# Patient Record
Sex: MALE | Race: WHITE | NOT HISPANIC OR LATINO | Employment: OTHER | URBAN - METROPOLITAN AREA
[De-identification: names, ages, dates, MRNs, and addresses within clinical notes are randomized per-mention and may not be internally consistent; named-entity substitution may affect disease eponyms.]

---

## 2019-10-23 ENCOUNTER — CONSULT (OUTPATIENT)
Dept: CARDIOLOGY | Facility: CLINIC | Age: 69
End: 2019-10-23

## 2019-10-23 VITALS
BODY MASS INDEX: 30.1 KG/M2 | WEIGHT: 215 LBS | SYSTOLIC BLOOD PRESSURE: 146 MMHG | HEART RATE: 57 BPM | HEIGHT: 71 IN | DIASTOLIC BLOOD PRESSURE: 82 MMHG | OXYGEN SATURATION: 98 %

## 2019-10-23 DIAGNOSIS — R93.1 ABNORMAL ECHOCARDIOGRAM: Primary | ICD-10-CM

## 2019-10-23 DIAGNOSIS — G45.9 TIA (TRANSIENT ISCHEMIC ATTACK): ICD-10-CM

## 2019-10-23 DIAGNOSIS — I66.13: ICD-10-CM

## 2019-10-23 DIAGNOSIS — E78.5 DYSLIPIDEMIA: ICD-10-CM

## 2019-10-23 DIAGNOSIS — I48.91 ATRIAL FIBRILLATION, UNSPECIFIED TYPE (HCC): ICD-10-CM

## 2019-10-23 DIAGNOSIS — I10 ESSENTIAL HYPERTENSION: ICD-10-CM

## 2019-10-23 PROBLEM — E07.9 THYROID DISEASE: Status: ACTIVE | Noted: 2019-10-23

## 2019-10-23 PROCEDURE — 93000 ELECTROCARDIOGRAM COMPLETE: CPT | Performed by: PHYSICIAN ASSISTANT

## 2019-10-23 PROCEDURE — 99204 OFFICE O/P NEW MOD 45 MIN: CPT | Performed by: PHYSICIAN ASSISTANT

## 2019-10-23 RX ORDER — UBIDECARENONE 100 MG
100 CAPSULE ORAL DAILY
COMMUNITY

## 2019-10-23 RX ORDER — FERROUS SULFATE 325(65) MG
325 TABLET ORAL
COMMUNITY

## 2019-10-23 RX ORDER — HYDROCHLOROTHIAZIDE 12.5 MG/1
12.5 CAPSULE, GELATIN COATED ORAL DAILY
Qty: 30 CAPSULE | Refills: 11 | Status: SHIPPED | OUTPATIENT
Start: 2019-10-23 | End: 2020-05-08

## 2019-10-23 RX ORDER — LEVOTHYROXINE SODIUM 137 UG/1
137 TABLET ORAL DAILY
COMMUNITY

## 2019-10-23 RX ORDER — OMEPRAZOLE 20 MG/1
20 CAPSULE, DELAYED RELEASE ORAL DAILY
COMMUNITY

## 2019-10-23 RX ORDER — CARVEDILOL 25 MG/1
25 TABLET ORAL 2 TIMES DAILY WITH MEALS
COMMUNITY

## 2019-10-23 RX ORDER — CLOPIDOGREL BISULFATE 75 MG/1
75 TABLET ORAL DAILY
COMMUNITY

## 2019-10-23 RX ORDER — RAMIPRIL 10 MG/1
10 CAPSULE ORAL 2 TIMES DAILY
COMMUNITY

## 2019-10-23 RX ORDER — AMLODIPINE BESYLATE 5 MG/1
5 TABLET ORAL DAILY
COMMUNITY

## 2019-10-23 RX ORDER — ASPIRIN 81 MG/1
81 TABLET ORAL DAILY
COMMUNITY

## 2019-10-23 RX ORDER — PAROXETINE HYDROCHLORIDE HEMIHYDRATE 25 MG/1
25 TABLET, FILM COATED, EXTENDED RELEASE ORAL EVERY MORNING
COMMUNITY

## 2019-10-23 RX ORDER — ROSUVASTATIN CALCIUM 20 MG/1
20 TABLET, COATED ORAL DAILY
COMMUNITY

## 2019-10-23 RX ORDER — CYANOCOBALAMIN 1000 UG/ML
1000 INJECTION, SOLUTION INTRAMUSCULAR; SUBCUTANEOUS
COMMUNITY

## 2019-10-23 NOTE — PROGRESS NOTES
[facility] - Office Note  Nikolas Le         [address] [facility] [address]  1950   [phone] (home)      LOCATION:  [facility] office.  Visit Type: Consult.    PCP:  Tae Bansal, DO    10/23/19   Nikolas Le is a 69 y.o.  male  retired.      Chief Complaint: Abnormal Echo, PFO.    Problem List/PMHx:   1.  Abnormal Echocardiogram       A.  Transthoracic echo 9/14/2019 Dr. Pennington: EF 55 to 60%, mild concentric LVH, mild AR, PA pressure 29 mmHg.       B.  Omitted echo with bubble study only 9/24/2019: Positive for patent foramen ovale with intra-arterial shunt from right to left.   2.  TIA (Transient Ischemic Attack)       A.  Presents with double vision, dysphasia, slurred speech       B.  No acute findings by imaging.  Echo shows normal EF.  Positive PFO by limited study.  Carotid duplex shows less than 50% stenosis bilateral ICA.       C.  Placed on Plavix in addition to low-dose aspirin, statin therapy.   3.  Essential Hypertension   4.  Dyslipidemia   5.  Thyroid Disease, on chronic supplementation  6.  Osteoarthritis  7.  Pernicious anemia       Allergies   Allergen Reactions   • Keflex [Cephalexin] Other (See Comments)     Chills. Body aches   • Penicillins Unknown (See Comments)     As a child   • Tetracyclines & Related Other (See Comments)     Mouth Blisters         Current Outpatient Medications:   •  amLODIPine (NORVASC) 5 MG tablet, Take 5 mg by mouth Daily., Disp: , Rfl:   •  aspirin 81 MG EC tablet, Take 81 mg by mouth Daily., Disp: , Rfl:   •  carvedilol (COREG) 25 MG tablet, Take 25 mg by mouth 2 (Two) Times a Day With Meals., Disp: , Rfl:   •  clopidogrel (PLAVIX) 75 MG tablet, Take 75 mg by mouth Daily., Disp: , Rfl:   •  coenzyme Q10 100 MG capsule, Take 100 mg by mouth Daily., Disp: , Rfl:   •  cyanocobalamin 1000 MCG/ML injection, Inject 1,000 mcg into the appropriate muscle as directed by prescriber Every 14 (Fourteen) Days., Disp:  , Rfl:   •  ferrous sulfate 325 (65 FE) MG tablet, Take 325 mg by mouth Daily With Breakfast., Disp: , Rfl:   •  levothyroxine (SYNTHROID, LEVOTHROID) 137 MCG tablet, Take 137 mcg by mouth Daily., Disp: , Rfl:   •  omeprazole (priLOSEC) 20 MG capsule, Take 20 mg by mouth Daily., Disp: , Rfl:   •  PARoxetine CR (PAXIL-CR) 25 MG 24 hr tablet, Take 25 mg by mouth Every Morning., Disp: , Rfl:   •  ramipril (ALTACE) 10 MG capsule, Take 10 mg by mouth 2 (Two) Times a Day., Disp: , Rfl:   •  rosuvastatin (CRESTOR) 20 MG tablet, Take 20 mg by mouth Daily., Disp: , Rfl:   •  Triamcinolone Acetonide (KENALOG-40 IJ), Inject  as directed., Disp: , Rfl:   •  VITAMIN D PO, Take 1,200 Units by mouth., Disp: , Rfl:     HPI  Nikolas Le is a 69-year-old  male who comes us today in consultation for an abnormal echocardiogram.  He recently had a TIA and underwent a neurologic work-up.  He presented with approximate 1 to 2 minutes of double vision followed by a aphasia and slurred speech.  By the time he arrived to the hospital he was back to baseline.  Carotid duplex showed less than 50% bilateral stenoses, echocardiogram showed no significant valvular heart disease, normal EF.  A limited echo/bubble study only confirmed PFO.  At time of discharge, he was continued on low-dose aspirin with the addition of Plavix.  His statin was changed from Lipitor to Crestor and an additional antihypertensive was also added.    He has been referred to us for further care team evaluation of the PFO.  He has had no prior neurologic issues or symptoms.  He has been on blood pressure medications for several years with fairly good control.  He denies chest pain or angina, denies dyspnea or orthopnea.  He denies palpitations or irregular heartbeats.  Again, this episode of double vision was his first and he has had no further recurrence.          The following portions of the patient's history were reviewed in the chart and updated as  "appropriate: allergies, current medications, past family history, past medical history, past social history, past surgical history and problem list.    Review of Systems   Constitution: Negative for weakness and malaise/fatigue.   Eyes: Positive for double vision and pain. Negative for blurred vision, vision loss in left eye, vision loss in right eye and visual halos.   Cardiovascular: Negative for chest pain, dyspnea on exertion, irregular heartbeat, leg swelling, near-syncope, orthopnea and palpitations.   Respiratory: Negative for shortness of breath, sleep disturbances due to breathing and snoring.    Musculoskeletal: Positive for arthritis, joint pain and stiffness. Negative for falls.   Neurological: Negative for excessive daytime sleepiness, dizziness, headaches and light-headedness.   All other systems reviewed and are negative.            height is 180.3 cm (71\") and weight is 97.5 kg (215 lb). His blood pressure is 146/82 and his pulse is 57. His oxygen saturation is 98%.   Physical Exam   Constitutional: Vital signs are normal. He appears well-developed and well-nourished.   HENT:   Head: Normocephalic and atraumatic.   Right Ear: Hearing and external ear normal.   Left Ear: Hearing and external ear normal.   Nose: Nose normal. No septal deviation.   Mouth/Throat: Oropharynx is clear and moist and mucous membranes are normal.   Eyes: Conjunctivae, EOM and lids are normal. Pupils are equal, round, and reactive to light. Right conjunctiva is not injected. Left conjunctiva is not injected.   Neck: Normal range of motion. No JVD present. Carotid bruit is not present. No edema and no erythema present. No thyroid mass and no thyromegaly present.   Cardiovascular: Normal rate, regular rhythm, S1 normal, S2 normal, normal heart sounds, intact distal pulses and normal pulses. PMI is not displaced.   No murmur heard.  Pulses:       Radial pulses are 2+ on the right side, and 2+ on the left side.        Dorsalis " pedis pulses are 2+ on the right side, and 2+ on the left side.        Posterior tibial pulses are 2+ on the right side, and 2+ on the left side.   Pulmonary/Chest: Effort normal and breath sounds normal. No respiratory distress. He has no decreased breath sounds. He has no wheezes. He has no rhonchi. He has no rales.   Abdominal: Soft. Normal appearance, normal aorta and bowel sounds are normal. He exhibits no abdominal bruit and no mass. There is no hepatosplenomegaly. There is no tenderness.   Musculoskeletal: Normal range of motion.   Neurological: He is alert.   Skin: Skin is warm, dry and intact. No cyanosis. Nails show no clubbing.   Psychiatric: He has a normal mood and affect. His speech is normal.       I have examined the patient and agree with the above      ECG 12 Lead  Date/Time: 10/23/2019 11:10 AM  Performed by: Brandi Hung PA  Authorized by: Brandi Hung PA   Previous ECG: no previous ECG available  Rhythm: sinus rhythm  Rate: normal  QRS axis: normal    Clinical impression: non-specific ECG             Assessment/ Plan     Abnormal echocardiogram: Echo reports reviewed.  Normal EF, no valvular or wall motion abnormalities.  PFO noted on a limited bubble study.  Mr. Le does have multiple risk factors which include age, hypertension, dyslipidemia.  At this time we will proceed with transcranial Doppler as this is more specific than DOTTIE for evaluation of TIA/PFO findings.  Further recommendations based on outcomes.  Would continue on aspirin, Plavix, statin in the meantime.    Essential hypertension: He is currently on ACE inhibitor, beta-blocker with a new addition of calcium channel blocker.  He could still use better control.  Add HCTZ 12.5 mg    Dyslipidemia: Lipid panel 2/12/2019 reviewed: , TG 84, HDL 53, LDL 96.  Continue on dose statin therapy and appropriate diet.  Weight loss and aerobic activity also encouraged.  Check lipids to ensure LDL near 70.    TIA (transient  "ischemic attack): Recent episode of double vision with speech involvement.  Other than PFO noted on limited bubble study, work-up was within normal limits.  I believe the likely causes his carotid artery disease, and less likely asymptomatic atrial fibrillation, with paradoxical embolism through his PFO being the cause.  Continue dual antiplatelet therapy and statin for now.  Continue blood pressure management adding HCTZ as described above.  Check TCD, and A. fib monitor.  Further recommendations based on further evaluation of the PFO, which we will close only if there is significantly elevated \"hit\" rate on his transcranial Doppler.        Brandi Hung PA-C working with Dr. Morrow  10/23/2019 11:01 AM    Electronically signed by NORAH Stone, 10/23/19, 11:19 AM.  I have seen and examined the patient, I have reviewed the note, discussed the case with the advance practice clinician, made necessary changes and I agree with the final note.    Shaheed Morrow MD  10/23/19  12:19 PM        "

## 2019-11-05 ENCOUNTER — HOSPITAL ENCOUNTER (OUTPATIENT)
Dept: CARDIOLOGY | Facility: HOSPITAL | Age: 69
Discharge: HOME | End: 2019-11-05
Admitting: INTERNAL MEDICINE

## 2019-11-05 ENCOUNTER — HOSPITAL ENCOUNTER (OUTPATIENT)
Dept: CARDIOLOGY | Facility: HOSPITAL | Age: 69
Discharge: HOME | End: 2019-11-05

## 2019-11-05 DIAGNOSIS — I66.13: ICD-10-CM

## 2019-11-05 DIAGNOSIS — G45.9 TIA (TRANSIENT ISCHEMIC ATTACK): ICD-10-CM

## 2019-11-05 DIAGNOSIS — G45.9 TIA (TRANSIENT ISCHEMIC ATTACK): Primary | ICD-10-CM

## 2019-11-05 DIAGNOSIS — R93.1 ABNORMAL ECHOCARDIOGRAM: ICD-10-CM

## 2019-11-05 LAB
BH CV VAS TCD LEFT DISTAL M1: 48 CM/SEC
BH CV VAS TCD LEFT MID M1: 48 CM/SEC
BH CV VAS TCD LEFT PROXIMAL M1: 43 CM/SEC
BH CV VAS TCD LEFT TERMINAL ICA: 29 CM/SEC
BH CV VAS TCD RIGHT DISTAL M1: 55 CM/SEC
BH CV VAS TCD RIGHT MID M1: 52 CM/SEC
BH CV VAS TCD RIGHT PROXIMAL M1: 51 CM/SEC
BH CV VAS TCD RIGHT TERMINAL ICA: 50 CM/SEC

## 2019-11-05 PROCEDURE — 93893 TCD STD ICR ART VEN-ART SHNT: CPT

## 2019-11-07 ENCOUNTER — TELEPHONE (OUTPATIENT)
Dept: CARDIOLOGY | Facility: CLINIC | Age: 69
End: 2019-11-07

## 2019-11-07 NOTE — TELEPHONE ENCOUNTER
Spoke with patient, advised of below.  Understanding verbalized.         ----- Message from Shaheed Morrow MD sent at 11/6/2019  3:26 PM EST -----  See please notify patient, very few bubbles noted.  Discussed with the reading physician, not enough to require PFO closure.  Would simply continue aspirin and Plavix for now     pt comfortable meds given dinner given and tolerated . pt pending labs and observation

## 2019-12-06 ENCOUNTER — TELEPHONE (OUTPATIENT)
Dept: CARDIOLOGY | Facility: CLINIC | Age: 69
End: 2019-12-06

## 2020-05-07 NOTE — PROGRESS NOTES
[facility]  Telehealth Follow-up note  Subjective:     Encounter Date:05/08/2020      Patient ID: Nikolas Le is a  69 y.o. male.    Chief Complaint: Abnormal ECG      PROBLEM LIST:  1. PFO:  a. Echo, 09/14/2019, Dr. Pennington: EF 55-60%. Mild concentric LVH. Mild AI.   b. Bubble study, 09/24/2019: POSITIVE for PFO with interatrial shunt from right-to-left.   c. Doppler transcranial microbubble injection, 11/05/2019: small amount HITS are seen in the left MCA.  2. TIA:   a. ED presentation with double vision, dysphasia, slurred speech.[facility] 09/13/201.Medical therapy.  b. No acute findings by imaging. Echo positive for PFO.   c. Carotid duplex, 09/13/2019: 0-49% ICA stenosis bilaterally.  d. MCOT, 10/29/2019: No AF. Rare PACs/PVCs.   e. 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  3. Palpitations:  a. 24h Holter, 04/25/2012: SR with HR  bpm. No significant arrhythmias. Rare PACs/PVCs.  b. Stress echocardiogram, 05/09/2012: EF 62%. No evidence of ischemia. Dobutamine induced LITZY with moderate gradient across the LVOT with peak gradient 43.8, mean gradient 23.5.  c. 24h Holter, 02/19/2016: SR with HR 50-82 bpm. No significant arrhythmias. Rare PACs/PVCs.  4. Hypertension.  5. Dyslipidemia.  6. Thyroid disease.  7. OA.  8. Surgical history:  a. Inguinal hernia repair.    History of Present Illness  Nikolas Le has a telephone visit today for 6 month follow up with a history of TIA, PFO, and cardiac risk factors. Since last visit, patient overall is doing very well.  Denies any cardiovascular plaints, specifically no angina shortness of breath chest pain or any symptoms with exertion.  No neurologic symptoms.  Review test results with the patient.    Allergies   Allergen Reactions   • Keflex [Cephalexin] Other (See Comments)     Chills. Body  aches   • Penicillins Unknown (See Comments)     As a child   • Tetracyclines & Related Other (See Comments)     Mouth Blisters         Current Outpatient Medications:   •  amLODIPine (NORVASC) 5 MG tablet, Take 5 mg by mouth Daily., Disp: , Rfl:   •  aspirin 81 MG EC tablet, Take 81 mg by mouth Daily., Disp: , Rfl:   •  carvedilol (COREG) 25 MG tablet, Take 25 mg by mouth 2 (Two) Times a Day With Meals., Disp: , Rfl:   •  clopidogrel (PLAVIX) 75 MG tablet, Take 75 mg by mouth Daily., Disp: , Rfl:   •  coenzyme Q10 100 MG capsule, Take 100 mg by mouth Daily., Disp: , Rfl:   •  cyanocobalamin 1000 MCG/ML injection, Inject 1,000 mcg into the appropriate muscle as directed by prescriber Every 14 (Fourteen) Days., Disp: , Rfl:   •  ferrous sulfate 325 (65 FE) MG tablet, Take 325 mg by mouth Daily With Breakfast., Disp: , Rfl:   •  hydroxychloroquine (PLAQUENIL) 200 MG tablet, Take 200 mg by mouth 2 (Two) Times a Day., Disp: , Rfl:   •  levothyroxine (SYNTHROID, LEVOTHROID) 137 MCG tablet, Take 137 mcg by mouth Daily., Disp: , Rfl:   •  Magnesium 400 MG tablet, Take 400 mg by mouth Daily., Disp: , Rfl:   •  omeprazole (priLOSEC) 20 MG capsule, Take 20 mg by mouth Daily., Disp: , Rfl:   •  PARoxetine CR (PAXIL-CR) 25 MG 24 hr tablet, Take 25 mg by mouth Every Morning., Disp: , Rfl:   •  ramipril (ALTACE) 10 MG capsule, Take 10 mg by mouth 2 (Two) Times a Day., Disp: , Rfl:   •  rosuvastatin (CRESTOR) 20 MG tablet, Take 20 mg by mouth Daily., Disp: , Rfl:   •  Triamcinolone Acetonide (KENALOG-40 IJ), Inject  as directed., Disp: , Rfl:   •  VITAMIN D PO, Take 1,200 Units by mouth., Disp: , Rfl:   •  Zinc 50 MG tablet, Take 50 mg by mouth Daily., Disp: , Rfl:     The following portions of the patient's history were reviewed and updated as appropriate: allergies, current medications, past family history, past medical history, past social history, past surgical history and problem list.    Review of Systems   Constitution:  "Negative.   Cardiovascular: Negative.    Respiratory: Negative.    Hematologic/Lymphatic: Negative for bleeding problem. Does not bruise/bleed easily.   Skin: Negative for rash.   Musculoskeletal: Positive for arthritis. Negative for muscle weakness and myalgias.   Gastrointestinal: Negative for heartburn, nausea and vomiting.   Neurological: Negative.           Objective:     Vitals:    05/08/20 0852   BP: 146/80   BP Location: Right arm   Patient Position: Sitting   Pulse: 65   Weight: 95.7 kg (211 lb)   Height: 180.3 cm (71\")         Physical Exam  No physical exam performed secondary to telephone visit.     Lab Review:    Lab date: 02/11/2019  • FLP: , TG 84, HDL 53, LDL 96  • CMP within normal limits       Procedures        Assessment:   Nikolas was seen today for abnormal ecg.    Diagnoses and all orders for this visit:    TIA (transient ischemic attack)    Essential hypertension    Dyslipidemia          Plan:  1. TIA, due to small vessel disease most likely.  Continue aspirin Plavix and statin.  2. Dyslipidemia on high-dose statin will recheck lipids through primary care physician at next visit.  3. Small PFO, very little right to left shunting noted with Valsalva.  We will simply follow for now.  Does not need closed at this time.  4. Hypertension, normally well controlled, patient just took his medicines 10 minutes for the above check.  5. Continue current medications.  6. Revisit in 12 MO, or sooner as needed.    This patient has consented to a telehealth visit via telephone. The visit was scheduled as a telephone visit to comply with patient safety concerns in accordance with CDC recommendations.  All vitals recorded within this visit are reported by the patient.  I spent 13 minutes in total including but not limited to the 6 minutes spent in direct conversation with this patient.       Shaheed Morrow MD      Please note that portions of this note may have been completed with a voice recognition " program. Efforts were made to edit the dictations, but occasionally words are mistranscribed.

## 2020-05-08 ENCOUNTER — OFFICE VISIT (OUTPATIENT)
Dept: CARDIOLOGY | Facility: CLINIC | Age: 70
End: 2020-05-08

## 2020-05-08 VITALS
HEART RATE: 65 BPM | SYSTOLIC BLOOD PRESSURE: 146 MMHG | BODY MASS INDEX: 29.54 KG/M2 | DIASTOLIC BLOOD PRESSURE: 80 MMHG | WEIGHT: 211 LBS | HEIGHT: 71 IN

## 2020-05-08 DIAGNOSIS — E78.5 DYSLIPIDEMIA: ICD-10-CM

## 2020-05-08 DIAGNOSIS — G45.9 TIA (TRANSIENT ISCHEMIC ATTACK): Primary | ICD-10-CM

## 2020-05-08 DIAGNOSIS — I10 ESSENTIAL HYPERTENSION: ICD-10-CM

## 2020-05-08 PROCEDURE — 99441 PR PHYS/QHP TELEPHONE EVALUATION 5-10 MIN: CPT | Performed by: INTERNAL MEDICINE

## 2020-05-08 RX ORDER — ZINC GLUCONATE 50 MG
50 TABLET ORAL DAILY
COMMUNITY

## 2020-05-08 RX ORDER — CALCIUM CARBONATE 300MG(750)
400 TABLET,CHEWABLE ORAL DAILY
COMMUNITY

## 2020-05-08 RX ORDER — HYDROXYCHLOROQUINE SULFATE 200 MG/1
200 TABLET, FILM COATED ORAL 2 TIMES DAILY
COMMUNITY

## 2021-05-08 NOTE — PROGRESS NOTES
[facility]  Subjective:     Encounter Date: 05/10/2021      Patient ID: Nikolas Le is a 70 y.o. male.    Chief Complaint: Transient Ischemic Attack      PROBLEM LIST:  1. PFO:  a. Echo, 09/14/2019, Dr. Pennington: EF 55-60%. Mild concentric LVH. Mild AI.   b. Bubble study, 09/24/2019: POSITIVE for PFO with interatrial shunt from right-to-left.   c. Doppler transcranial microbubble injection, 11/05/2019: small amount HITS are seen in the left MCA.  2. TIA:   a. ED presentation with double vision, dysphasia, slurred speech.[facility] 09/13/201.Medical therapy.  b. No acute findings by imaging. Echo positive for PFO.   c. Carotid duplex, 09/13/2019: 0-49% ICA stenosis bilaterally.  d. MCOT, 10/29/2019: No AF. Rare PACs/PVCs.   e. 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  3. Palpitations:  a. 24h Holter, 04/25/2012: SR with HR  bpm. No significant arrhythmias. Rare PACs/PVCs.  b. Stress echocardiogram, 05/09/2012: EF 62%. No evidence of ischemia. Dobutamine induced LITZY with moderate gradient across the LVOT with peak gradient 43.8, mean gradient 23.5.  c. 24h Holter, 02/19/2016: SR with HR 50-82 bpm. No significant arrhythmias. Rare PACs/PVCs.  4. Hypertension.  5. Dyslipidemia.  6. Thyroid disease.  7. OA.  8. Surgical history:  a. Inguinal hernia repair.      History of Present Illness  Nikolas Le returns today for an annual follow up with a history of transient ischemic attack and cardiac risk factors. Since last visit, he has been doing well overall from a cardiovascular standpoint. He mentions he bleeds and bruises easily since being on Plavix. He also has been using kenalog injections for his arthritis. He has not done much physically since the pandemic. He has received both of his COVID-19 immunizations. He recently just go back from [facility]  vipin and mentions he did no thave to wear a mask there. atient denies chest pain, shortness of breath, palpitations, edema, dizziness, and syncope. Patient denies chest pain, shortness of breath, palpitations, edema, dizziness, and syncope.         Allergies   Allergen Reactions   • Tetracyclines & Related Other (See Comments)     Mouth Blisters   • Erythromycin Rash and Unknown - Low Severity   • Keflex [Cephalexin] Other (See Comments)     Chills. Body aches   • Penicillins Unknown (See Comments)     As a child   • Sulfa Antibiotics Rash     Swelling and sores in mouth  Mouth sores           Current Outpatient Medications:   •  amLODIPine (NORVASC) 5 MG tablet, Take 5 mg by mouth Daily., Disp: , Rfl:   •  aspirin 81 MG EC tablet, Take 81 mg by mouth Daily., Disp: , Rfl:   •  carvedilol (COREG) 25 MG tablet, Take 25 mg by mouth 2 (Two) Times a Day With Meals., Disp: , Rfl:   •  clopidogrel (PLAVIX) 75 MG tablet, Take 75 mg by mouth Daily., Disp: , Rfl:   •  coenzyme Q10 100 MG capsule, Take 100 mg by mouth Daily., Disp: , Rfl:   •  cyanocobalamin 1000 MCG/ML injection, Inject 1,000 mcg into the appropriate muscle as directed by prescriber Every 14 (Fourteen) Days., Disp: , Rfl:   •  ferrous sulfate 325 (65 FE) MG tablet, Take 325 mg by mouth Daily With Breakfast., Disp: , Rfl:   •  HYDROcodone-acetaminophen (NORCO) 5-325 MG per tablet, Take 1 tablet by mouth Every 6 (Six) Hours As Needed., Disp: , Rfl:   •  hydroxychloroquine (PLAQUENIL) 200 MG tablet, Take 200 mg by mouth 2 (Two) Times a Day., Disp: , Rfl:   •  levothyroxine (SYNTHROID, LEVOTHROID) 137 MCG tablet, Take 137 mcg by mouth Daily., Disp: , Rfl:   •  Magnesium 400 MG tablet, Take 400 mg by mouth Daily., Disp: , Rfl:   •  omeprazole (priLOSEC) 20 MG capsule, Take 20 mg by mouth Daily., Disp: , Rfl:   •  PARoxetine CR (PAXIL-CR) 25 MG 24 hr tablet, Take 25 mg by mouth Every Morning., Disp: , Rfl:   •  ramipril (ALTACE) 10 MG capsule, Take 10 mg by mouth  "2 (Two) Times a Day., Disp: , Rfl:   •  rosuvastatin (CRESTOR) 20 MG tablet, Take 20 mg by mouth Daily., Disp: , Rfl:   •  Triamcinolone Acetonide (KENALOG-40 IJ), Inject  as directed. Every 4 months, Disp: , Rfl:   •  VITAMIN D PO, Take 1,200 Units by mouth., Disp: , Rfl:   •  Zinc 50 MG tablet, Take 50 mg by mouth Daily., Disp: , Rfl:     The following portions of the patient's history were reviewed and updated as appropriate: allergies, current medications, past family history, past medical history, past social history, past surgical history and problem list.    Review of Systems   Constitutional: Negative.   Cardiovascular: Negative for chest pain, dyspnea on exertion, leg swelling, palpitations and syncope.   Respiratory: Negative.  Negative for shortness of breath.    Hematologic/Lymphatic: Negative for bleeding problem. Bruises/bleeds easily.   Skin: Negative for rash.   Musculoskeletal: Positive for arthritis, joint pain and joint swelling. Negative for muscle weakness and myalgias.   Gastrointestinal: Negative for heartburn, nausea and vomiting.   Neurological: Negative for dizziness, light-headedness, loss of balance and numbness.          Objective:     Vitals:    05/10/21 0924   BP: 132/76   BP Location: Left arm   Patient Position: Sitting   Pulse: 73   Temp: 96 °F (35.6 °C)   SpO2: 97%   Weight: 97.8 kg (215 lb 9.6 oz)   Height: 180.3 cm (71\")         Constitutional:       Appearance: Well-developed.   Neck:      Thyroid: No thyromegaly.      Vascular: No carotid bruit or JVD.   Pulmonary:      Breath sounds: Normal breath sounds.   Cardiovascular:      Regular rhythm.      No gallop. No S3 and S4 gallop.   Edema:     Peripheral edema absent.   Abdominal:      General: Bowel sounds are normal.      Palpations: Abdomen is soft. There is no abdominal mass.      Tenderness: There is no abdominal tenderness.   Skin:     General: Skin is warm and dry.      Findings: No rash.   Neurological:      Mental " Status: Alert and oriented to person, place, and time.         Lab Review:    Lab Results   Component Value Date    TSH 2.603 02/17/2020          Procedures     Advance Care Planning   ACP discussion was held with the patient during this visit. Patient does not have an advance directive, information provided.          Assessment:   Diagnoses and all orders for this visit:    1. TIA (transient ischemic attack) (Primary)    2. Essential hypertension    3. Dyslipidemia        Impression:  1. TIA. Stable. On Plavix.   2. Hypertension. Well controlled. On amlodipine, carvedilol, and ramipril.  3. Dyslipidemia. No new data to review. On rosuvastatin.     Plan:  1. Stable cardiac status.  2. Continue current medications.  3. Revisit in 12 MO, or sooner as needed.    Scribed for Shaheed Morrow MD by Poornima Schwarz. 5/10/2021 10:02 EDT      Shaheed Morrow MD      Please note that portions of this note may have been completed with a voice recognition program. Efforts were made to edit the dictations, but occasionally words are mistranscribed.

## 2021-05-10 ENCOUNTER — OFFICE VISIT (OUTPATIENT)
Dept: CARDIOLOGY | Facility: CLINIC | Age: 71
End: 2021-05-10

## 2021-05-10 VITALS
SYSTOLIC BLOOD PRESSURE: 132 MMHG | OXYGEN SATURATION: 97 % | HEIGHT: 71 IN | WEIGHT: 215.6 LBS | HEART RATE: 73 BPM | TEMPERATURE: 96 F | BODY MASS INDEX: 30.18 KG/M2 | DIASTOLIC BLOOD PRESSURE: 76 MMHG

## 2021-05-10 DIAGNOSIS — E78.5 DYSLIPIDEMIA: ICD-10-CM

## 2021-05-10 DIAGNOSIS — I10 ESSENTIAL HYPERTENSION: ICD-10-CM

## 2021-05-10 DIAGNOSIS — G45.9 TIA (TRANSIENT ISCHEMIC ATTACK): Primary | ICD-10-CM

## 2021-05-10 PROCEDURE — 99213 OFFICE O/P EST LOW 20 MIN: CPT | Performed by: INTERNAL MEDICINE

## 2021-05-10 RX ORDER — HYDROCODONE BITARTRATE AND ACETAMINOPHEN 5; 325 MG/1; MG/1
1 TABLET ORAL EVERY 6 HOURS PRN
COMMUNITY

## 2021-08-06 ENCOUNTER — TELEPHONE (OUTPATIENT)
Dept: CARDIOLOGY | Facility: CLINIC | Age: 71
End: 2021-08-06

## 2021-08-06 NOTE — TELEPHONE ENCOUNTER
Patient requesting pre procedure risk assessment for cataract removal and all teeth dental extraction as well as Plavix instructions.

## 2022-05-11 ENCOUNTER — OFFICE VISIT (OUTPATIENT)
Dept: CARDIOLOGY | Facility: CLINIC | Age: 72
End: 2022-05-11

## 2022-05-11 VITALS
SYSTOLIC BLOOD PRESSURE: 132 MMHG | BODY MASS INDEX: 29.68 KG/M2 | OXYGEN SATURATION: 96 % | HEIGHT: 71 IN | HEART RATE: 66 BPM | WEIGHT: 212 LBS | DIASTOLIC BLOOD PRESSURE: 68 MMHG

## 2022-05-11 DIAGNOSIS — I10 ESSENTIAL HYPERTENSION: Primary | ICD-10-CM

## 2022-05-11 DIAGNOSIS — Q21.12 PFO (PATENT FORAMEN OVALE): ICD-10-CM

## 2022-05-11 DIAGNOSIS — I10 ESSENTIAL HYPERTENSION: ICD-10-CM

## 2022-05-11 DIAGNOSIS — E78.5 DYSLIPIDEMIA: ICD-10-CM

## 2022-05-11 DIAGNOSIS — R93.1 ABNORMAL ECHOCARDIOGRAM: Primary | ICD-10-CM

## 2022-05-11 DIAGNOSIS — I65.23 BILATERAL CAROTID ARTERY STENOSIS: ICD-10-CM

## 2022-05-11 DIAGNOSIS — G45.9 TIA (TRANSIENT ISCHEMIC ATTACK): ICD-10-CM

## 2022-05-11 PROCEDURE — 99214 OFFICE O/P EST MOD 30 MIN: CPT | Performed by: INTERNAL MEDICINE

## 2022-05-11 NOTE — PROGRESS NOTES
Subjective:     Encounter Date:05/11/2022    Primary Care Physician: Tae Bansal DO      Patient ID: Nikolas Le is a 71 y.o. male.    Chief Complaint:Follow-up      PROBLEM LIST:  1. PFO:  a. Echo, 09/14/2019, Dr. Pennington: EF 55-60%. Mild concentric LVH. Mild AI.   b. Bubble study, 09/24/2019: POSITIVE for PFO with interatrial shunt from right-to-left.   c. Doppler transcranial microbubble injection, 11/05/2019: small amount HITS are seen in the left MCA.  2. TIA:   a. ED presentation with double vision, dysphasia, slurred speech.[facility] 09/13/201.Medical therapy.  b. No acute findings by imaging. Echo positive for PFO.   c. Carotid duplex, 09/13/2019: 0-49% ICA stenosis bilaterally.  d. MCOT, 10/29/2019: No AF. Rare PACs/PVCs.   e. Doppler transcranial microbubble injection, 11/05/2019: Following infusion of agitated saline, both before and after Valsalva, several HITS are seen in the left MCA.  3. Palpitations:  a. 24h Holter, 04/25/2012: SR with HR  bpm. No significant arrhythmias. Rare PACs/PVCs.  b. Stress echocardiogram, 05/09/2012: EF 62%. No evidence of ischemia. Dobutamine induced LITZY with moderate gradient across the LVOT with peak gradient 43.8, mean gradient 23.5.  c. 24h Holter, 02/19/2016: SR with HR 50-82 bpm. No significant arrhythmias. Rare PACs/PVCs.  4. Hypertension.  5. Dyslipidemia.  6. Thyroid disease.  7. OA.  8. Surgical history:  a. Inguinal hernia repair.        Allergies   Allergen Reactions   • Tetracyclines & Related Other (See Comments)     Mouth Blisters   • Erythromycin Rash and Unknown - Low Severity   • Keflex [Cephalexin] Other (See Comments)     Chills. Body aches   • Penicillins Unknown (See Comments)     As a child   • Sulfa Antibiotics Rash     Swelling and sores in mouth  Mouth sores           Current Outpatient Medications:   •  amLODIPine (NORVASC) 5 MG tablet, Take 5 mg by mouth Daily., Disp: , Rfl:   •  aspirin 81 MG EC tablet, Take 81 mg by mouth  "Daily., Disp: , Rfl:   •  carvedilol (COREG) 25 MG tablet, Take 25 mg by mouth 2 (Two) Times a Day With Meals., Disp: , Rfl:   •  clopidogrel (PLAVIX) 75 MG tablet, Take 75 mg by mouth Daily., Disp: , Rfl:   •  coenzyme Q10 100 MG capsule, Take 100 mg by mouth Daily., Disp: , Rfl:   •  cyanocobalamin 1000 MCG/ML injection, Inject 1,000 mcg into the appropriate muscle as directed by prescriber Every 14 (Fourteen) Days., Disp: , Rfl:   •  ferrous sulfate 325 (65 FE) MG tablet, Take 325 mg by mouth Daily With Breakfast., Disp: , Rfl:   •  HYDROcodone-acetaminophen (NORCO) 5-325 MG per tablet, Take 1 tablet by mouth Every 6 (Six) Hours As Needed., Disp: , Rfl:   •  hydroxychloroquine (PLAQUENIL) 200 MG tablet, Take 200 mg by mouth 2 (Two) Times a Day., Disp: , Rfl:   •  levothyroxine (SYNTHROID, LEVOTHROID) 137 MCG tablet, Take 137 mcg by mouth Daily., Disp: , Rfl:   •  Magnesium 400 MG tablet, Take 400 mg by mouth Daily., Disp: , Rfl:   •  omeprazole (priLOSEC) 20 MG capsule, Take 20 mg by mouth Daily., Disp: , Rfl:   •  PARoxetine CR (PAXIL-CR) 25 MG 24 hr tablet, Take 25 mg by mouth Every Morning., Disp: , Rfl:   •  ramipril (ALTACE) 10 MG capsule, Take 10 mg by mouth 2 (Two) Times a Day., Disp: , Rfl:   •  rosuvastatin (CRESTOR) 20 MG tablet, Take 20 mg by mouth Daily., Disp: , Rfl:   •  Triamcinolone Acetonide (KENALOG-40 IJ), Inject  as directed. Every 4 months, Disp: , Rfl:   •  VITAMIN D PO, Take 1,200 Units by mouth., Disp: , Rfl:   •  Zinc 50 MG tablet, Take 50 mg by mouth Daily., Disp: , Rfl:         History of Present Illness    Patient returns today for annual follow-up of previous stroke due to pulm patent foramen ovale and palpitations.  Since her last visit, he is doing well from a cardiovascular standpoint without any cardiovascular symptoms.  He is very active after his \"knee steroid injections\" and he can walk quite some distance until the analgesic effect wears off.  He is preop left knee " "replacement.    The following portions of the patient's history were reviewed and updated as appropriate: allergies, current medications, past family history, past medical history, past social history, past surgical history and problem list.      Social History     Tobacco Use   • Smoking status: Former Smoker   • Smokeless tobacco: Never Used   Vaping Use   • Vaping Use: Never used   Substance Use Topics   • Alcohol use: No   • Drug use: No         ROS       Objective:   /68   Pulse 66   Ht 180.3 cm (71\")   Wt 96.2 kg (212 lb)   SpO2 96%   BMI 29.57 kg/m²         Constitutional:       Appearance: Well-developed.   Neck:      Thyroid: No thyromegaly.      Vascular: No carotid bruit or JVD.   Pulmonary:      Breath sounds: Normal breath sounds.   Cardiovascular:      Regular rhythm.      No gallop. No S3 and S4 gallop.   Abdominal:      General: Bowel sounds are normal.      Palpations: Abdomen is soft. There is no abdominal mass.      Tenderness: There is no abdominal tenderness.   Skin:     General: Skin is warm and dry.      Findings: No rash.   Neurological:      Mental Status: Alert and oriented to person, place, and time.         Procedures          Assessment:   Assessment & Plan      Diagnoses and all orders for this visit:    1. Essential hypertension (Primary)    2. Dyslipidemia    3. PFO (patent foramen ovale)      1.  Patent foramen ovale.  Previous TIA.  No events on aspirin and Plavix  2.  Tachypalpitations resolved  3.  Mild carotid artery disease  4.  Preop left knee replacement    Recommendations:  1.  Check echocardiogram today   2.  Follow-up carotid duplex for carotid known carotid artery disease.  3.  Low cardiovascular risk of left knee replacement surgery may proceed as planned.  May hold Plavix 5 days prior to the procedure.  4.  Revisit annually apparent symptom change    Shaheed Morrow MD           Dictated utilizing Dragon dictation  "

## 2022-06-30 ENCOUNTER — HOSPITAL ENCOUNTER (OUTPATIENT)
Dept: CARDIOLOGY | Facility: HOSPITAL | Age: 72
Discharge: HOME | End: 2022-06-30

## 2022-06-30 VITALS — WEIGHT: 212 LBS | HEIGHT: 71 IN | BODY MASS INDEX: 29.68 KG/M2

## 2022-06-30 DIAGNOSIS — Q21.12 PFO (PATENT FORAMEN OVALE): ICD-10-CM

## 2022-06-30 DIAGNOSIS — I65.23 BILATERAL CAROTID ARTERY STENOSIS: ICD-10-CM

## 2022-06-30 DIAGNOSIS — R93.1 ABNORMAL ECHOCARDIOGRAM: ICD-10-CM

## 2022-06-30 DIAGNOSIS — I10 ESSENTIAL HYPERTENSION: ICD-10-CM

## 2022-06-30 DIAGNOSIS — G45.9 TIA (TRANSIENT ISCHEMIC ATTACK): ICD-10-CM

## 2022-06-30 LAB
BH CV ECHO MEAS - AI P1/2T: 707.2 MSEC
BH CV ECHO MEAS - AO MAX PG: 6.2 MMHG
BH CV ECHO MEAS - AO MEAN PG: 3.5 MMHG
BH CV ECHO MEAS - AO ROOT DIAM: 3.7 CM
BH CV ECHO MEAS - AO V2 MAX: 124.9 CM/SEC
BH CV ECHO MEAS - AO V2 VTI: 32.9 CM
BH CV ECHO MEAS - AVA(I,D): 2.04 CM2
BH CV ECHO MEAS - EDV(CUBED): 81.8 ML
BH CV ECHO MEAS - EDV(MOD-SP2): 174 ML
BH CV ECHO MEAS - EDV(MOD-SP4): 204 ML
BH CV ECHO MEAS - EF(MOD-BP): 64.7 %
BH CV ECHO MEAS - EF(MOD-SP2): 60.1 %
BH CV ECHO MEAS - EF(MOD-SP4): 68.4 %
BH CV ECHO MEAS - ESV(CUBED): 29.8 ML
BH CV ECHO MEAS - ESV(MOD-SP2): 69.4 ML
BH CV ECHO MEAS - ESV(MOD-SP4): 64.4 ML
BH CV ECHO MEAS - FS: 28.5 %
BH CV ECHO MEAS - IVS/LVPW: 1.05 CM
BH CV ECHO MEAS - IVSD: 1.61 CM
BH CV ECHO MEAS - LA DIMENSION: 4.3 CM
BH CV ECHO MEAS - LAT PEAK E' VEL: 10.7 CM/SEC
BH CV ECHO MEAS - LV MASS(C)D: 280.6 GRAMS
BH CV ECHO MEAS - LV MAX PG: 2.7 MMHG
BH CV ECHO MEAS - LV MEAN PG: 1.66 MMHG
BH CV ECHO MEAS - LV V1 MAX: 82.3 CM/SEC
BH CV ECHO MEAS - LV V1 VTI: 22.3 CM
BH CV ECHO MEAS - LVIDD: 4.3 CM
BH CV ECHO MEAS - LVIDS: 3.1 CM
BH CV ECHO MEAS - LVOT AREA: 3 CM2
BH CV ECHO MEAS - LVOT DIAM: 1.96 CM
BH CV ECHO MEAS - LVPWD: 1.53 CM
BH CV ECHO MEAS - MED PEAK E' VEL: 9.1 CM/SEC
BH CV ECHO MEAS - MV A MAX VEL: 108.8 CM/SEC
BH CV ECHO MEAS - MV DEC TIME: 0.26 MSEC
BH CV ECHO MEAS - MV E MAX VEL: 69 CM/SEC
BH CV ECHO MEAS - MV E/A: 0.63
BH CV ECHO MEAS - MV MAX PG: 4.7 MMHG
BH CV ECHO MEAS - MV MEAN PG: 1.83 MMHG
BH CV ECHO MEAS - MV V2 VTI: 38.2 CM
BH CV ECHO MEAS - MVA(VTI): 1.76 CM2
BH CV ECHO MEAS - PA V2 MAX: 115.4 CM/SEC
BH CV ECHO MEAS - PI END-D VEL: 141.2 CM/SEC
BH CV ECHO MEAS - SV(LVOT): 67.3 ML
BH CV ECHO MEAS - SV(MOD-SP2): 104.6 ML
BH CV ECHO MEAS - SV(MOD-SP4): 139.6 ML
BH CV ECHO MEAS - TAPSE (>1.6): 3.2 CM
BH CV ECHO MEASUREMENTS AVERAGE E/E' RATIO: 6.97
BH CV XLRA - RV BASE: 4.4 CM
BH CV XLRA - RV LENGTH: 7.5 CM
BH CV XLRA - RV MID: 3.6 CM
BH CV XLRA - TDI S': 13.9 CM/SEC
BH CV XLRA MEAS LEFT DIST CCA EDV: 28.6 CM/SEC
BH CV XLRA MEAS LEFT DIST CCA PSV: 95.7 CM/SEC
BH CV XLRA MEAS LEFT DIST ICA EDV: 26.9 CM/SEC
BH CV XLRA MEAS LEFT DIST ICA PSV: 85.6 CM/SEC
BH CV XLRA MEAS LEFT ICA/CCA RATIO: 0.96
BH CV XLRA MEAS LEFT MID CCA EDV: 19.9 CM/SEC
BH CV XLRA MEAS LEFT MID CCA PSV: 80.8 CM/SEC
BH CV XLRA MEAS LEFT MID ICA EDV: 28.5 CM/SEC
BH CV XLRA MEAS LEFT MID ICA PSV: 84.5 CM/SEC
BH CV XLRA MEAS LEFT PROX CCA EDV: 32.9 CM/SEC
BH CV XLRA MEAS LEFT PROX CCA PSV: 165.6 CM/SEC
BH CV XLRA MEAS LEFT PROX ECA EDV: 24.3 CM/SEC
BH CV XLRA MEAS LEFT PROX ECA PSV: 164 CM/SEC
BH CV XLRA MEAS LEFT PROX ICA EDV: 18.1 CM/SEC
BH CV XLRA MEAS LEFT PROX ICA PSV: 77.4 CM/SEC
BH CV XLRA MEAS LEFT PROX SCLA PSV: 168.5 CM/SEC
BH CV XLRA MEAS LEFT VERTEBRAL A EDV: 8.7 CM/SEC
BH CV XLRA MEAS LEFT VERTEBRAL A PSV: 32.6 CM/SEC
BH CV XLRA MEAS RIGHT DIST CCA EDV: 26.7 CM/SEC
BH CV XLRA MEAS RIGHT DIST CCA PSV: 88.2 CM/SEC
BH CV XLRA MEAS RIGHT DIST ICA EDV: 28 CM/SEC
BH CV XLRA MEAS RIGHT DIST ICA PSV: 95.7 CM/SEC
BH CV XLRA MEAS RIGHT ICA/CCA RATIO: 0.96
BH CV XLRA MEAS RIGHT MID CCA EDV: 26.1 CM/SEC
BH CV XLRA MEAS RIGHT MID CCA PSV: 83.2 CM/SEC
BH CV XLRA MEAS RIGHT MID ICA EDV: 26.1 CM/SEC
BH CV XLRA MEAS RIGHT MID ICA PSV: 72.1 CM/SEC
BH CV XLRA MEAS RIGHT PROX CCA EDV: 21.7 CM/SEC
BH CV XLRA MEAS RIGHT PROX CCA PSV: 108.1 CM/SEC
BH CV XLRA MEAS RIGHT PROX ECA EDV: 21.7 CM/SEC
BH CV XLRA MEAS RIGHT PROX ECA PSV: 95.7 CM/SEC
BH CV XLRA MEAS RIGHT PROX ICA EDV: 16.8 CM/SEC
BH CV XLRA MEAS RIGHT PROX ICA PSV: 79.5 CM/SEC
BH CV XLRA MEAS RIGHT PROX SCLA PSV: 156 CM/SEC
BH CV XLRA MEAS RIGHT VERTEBRAL A EDV: 12.2 CM/SEC
BH CV XLRA MEAS RIGHT VERTEBRAL A PSV: 41.1 CM/SEC
LEFT ARM BP: NORMAL MMHG
LEFT ATRIUM VOLUME INDEX: 15.1 ML/M2
LV EF 2D ECHO EST: 60 %
MAXIMAL PREDICTED HEART RATE: 147 BPM
MAXIMAL PREDICTED HEART RATE: 147 BPM
RIGHT ARM BP: NORMAL MMHG
STRESS TARGET HR: 125 BPM
STRESS TARGET HR: 125 BPM

## 2022-06-30 PROCEDURE — 93306 TTE W/DOPPLER COMPLETE: CPT

## 2022-06-30 PROCEDURE — 93306 TTE W/DOPPLER COMPLETE: CPT | Performed by: INTERNAL MEDICINE

## 2022-06-30 PROCEDURE — 93880 EXTRACRANIAL BILAT STUDY: CPT

## 2022-06-30 PROCEDURE — 93880 EXTRACRANIAL BILAT STUDY: CPT | Performed by: INTERNAL MEDICINE

## 2022-07-01 ENCOUNTER — TELEPHONE (OUTPATIENT)
Dept: CARDIOLOGY | Facility: CLINIC | Age: 72
End: 2022-07-01

## 2022-07-01 NOTE — TELEPHONE ENCOUNTER
Left VM advising of below    ----- Message from Shaheed Morrow MD sent at 6/30/2022  4:32 PM EDT -----  Both echo and carotids were normal

## 2022-09-08 ENCOUNTER — TELEPHONE (OUTPATIENT)
Dept: CARDIOLOGY | Facility: CLINIC | Age: 72
End: 2022-09-08

## 2022-09-08 NOTE — TELEPHONE ENCOUNTER
Requesting pre op risk assessment for knee surgery with DR. Newman.     Patient was given form to have signed.  Advised was given recommendations in May, no changes so advised can complete paperwork.  He will bring to Dr. Bansal's office and we can complete our next day there.

## 2023-01-09 ENCOUNTER — TELEPHONE (OUTPATIENT)
Dept: CARDIOLOGY | Facility: CLINIC | Age: 73
End: 2023-01-09
Payer: MEDICARE

## 2023-01-09 NOTE — TELEPHONE ENCOUNTER
Pt was going to have a Left knee replacement Dr. Fleming and is needing cardiac clearance.     [facility] to receive the fax number for Dr. Fleming.     Pt let me know that Dr. Fleming needs a new clearance letter due to only being good for 3 months.     Any instructions for Plavix or aspirin?

## 2023-01-10 NOTE — TELEPHONE ENCOUNTER
\"Low cardiovascular risk of left knee replacement surgery may proceed as planned.  May hold Plavix 5 days prior to the procedure\" per TM from [facility] on 5/11/22.    \"If no change in symptoms since last office visit recommendations remain the same. \" per Betsy BEGUM.     Pt denies any symptoms at this time. Pt notified of approval for surgery and recommendations for Plavix. Pt verbalized understanding.     Faxing clearance to [phone]

## 2023-05-24 ENCOUNTER — OFFICE VISIT (OUTPATIENT)
Dept: CARDIOLOGY | Facility: CLINIC | Age: 73
End: 2023-05-24
Payer: MEDICARE

## 2023-05-24 VITALS
HEIGHT: 71 IN | BODY MASS INDEX: 26.46 KG/M2 | OXYGEN SATURATION: 100 % | SYSTOLIC BLOOD PRESSURE: 110 MMHG | WEIGHT: 189 LBS | HEART RATE: 64 BPM | DIASTOLIC BLOOD PRESSURE: 80 MMHG

## 2023-05-24 DIAGNOSIS — I10 ESSENTIAL HYPERTENSION: ICD-10-CM

## 2023-05-24 DIAGNOSIS — I95.1 ORTHOSTATIC HYPOTENSION: Primary | ICD-10-CM

## 2023-05-24 DIAGNOSIS — Q21.12 PFO (PATENT FORAMEN OVALE): ICD-10-CM

## 2023-05-24 DIAGNOSIS — E78.5 DYSLIPIDEMIA: ICD-10-CM

## 2023-05-24 PROCEDURE — 1159F MED LIST DOCD IN RCRD: CPT | Performed by: NURSE PRACTITIONER

## 2023-05-24 PROCEDURE — 3074F SYST BP LT 130 MM HG: CPT | Performed by: NURSE PRACTITIONER

## 2023-05-24 PROCEDURE — 1160F RVW MEDS BY RX/DR IN RCRD: CPT | Performed by: NURSE PRACTITIONER

## 2023-05-24 PROCEDURE — 3079F DIAST BP 80-89 MM HG: CPT | Performed by: NURSE PRACTITIONER

## 2023-05-24 PROCEDURE — 99214 OFFICE O/P EST MOD 30 MIN: CPT | Performed by: NURSE PRACTITIONER

## 2023-05-24 NOTE — PROGRESS NOTES
Subjective:     Encounter Date:05/24/2023    Primary Care Physician: Tae Bansal DO      Patient ID: Nikolas Le is a 72 y.o. male.    Chief Complaint:Dizziness (Annual check up) and Hypertension    PROBLEM LIST:  1. PFO:  a. Echo, 09/14/2019, Dr. Pennington: EF 55-60%. Mild concentric LVH. Mild AI.   b. Bubble study, 09/24/2019: POSITIVE for PFO with interatrial shunt from right-to-left.   c. Doppler transcranial microbubble injection, 11/05/2019: small amount HITS are seen in the left MCA.  d. 6/2022 echo normal EF.  No significant valve disease.  2. TIA:   a. ED presentation with double vision, dysphasia, slurred speech.[facility] 09/13/201.Medical therapy.  b. No acute findings by imaging. Echo positive for PFO.   c. Carotid duplex, 09/13/2019: 0-49% ICA stenosis bilaterally.  d. MCOT, 10/29/2019: No AF. Rare PACs/PVCs.   e. Doppler transcranial microbubble injection, 11/05/2019: Following infusion of agitated saline, both before and after Valsalva, several HITS are seen in the left MCA.  f. 6/30/2022 normal bilateral carotids by duplex  3. Palpitations:  a. 24h Holter, 04/25/2012: SR with HR  bpm. No significant arrhythmias. Rare PACs/PVCs.  b. Stress echocardiogram, 05/09/2012: EF 62%. No evidence of ischemia. Dobutamine induced LITZY with moderate gradient across the LVOT with peak gradient 43.8, mean gradient 23.5.  c. 24h Holter, 02/19/2016: SR with HR 50-82 bpm. No significant arrhythmias. Rare PACs/PVCs.  4. Hypertension.  5. Dyslipidemia.  6. Thyroid disease.  7. OA.  8. Surgical history:  a. Inguinal hernia repair.  b. Left trisha put in the great to her trochanter right side.      Allergies   Allergen Reactions   • Tetracyclines & Related Other (See Comments)     Mouth Blisters   • Erythromycin Rash and Unknown - Low Severity   • Keflex [Cephalexin] Other (See Comments)     Chills. Body aches   • Penicillins Unknown (See Comments)     As a child   • Sulfa Antibiotics Rash     Swelling and sores  in mouth  Mouth sores           Current Outpatient Medications:   •  amLODIPine (NORVASC) 5 MG tablet, Take 1 tablet by mouth Daily., Disp: , Rfl:   •  aspirin 81 MG EC tablet, Take 1 tablet by mouth Daily., Disp: , Rfl:   •  carvedilol (COREG) 25 MG tablet, Take 1 tablet by mouth 2 (Two) Times a Day With Meals., Disp: , Rfl:   •  clopidogrel (PLAVIX) 75 MG tablet, Take 1 tablet by mouth Daily., Disp: , Rfl:   •  coenzyme Q10 100 MG capsule, Take 1 capsule by mouth Daily., Disp: , Rfl:   •  cyanocobalamin 1000 MCG/ML injection, Inject 1 mL into the appropriate muscle as directed by prescriber Every 14 (Fourteen) Days., Disp: , Rfl:   •  ferrous sulfate 325 (65 FE) MG tablet, Take 1 tablet by mouth Daily With Breakfast., Disp: , Rfl:   •  HYDROcodone-acetaminophen (NORCO) 5-325 MG per tablet, Take 1 tablet by mouth Every 6 (Six) Hours As Needed., Disp: , Rfl:   •  hydroxychloroquine (PLAQUENIL) 200 MG tablet, Take 1 tablet by mouth 2 (Two) Times a Day., Disp: , Rfl:   •  levothyroxine (SYNTHROID, LEVOTHROID) 137 MCG tablet, Take 1 tablet by mouth Daily., Disp: , Rfl:   •  Magnesium 400 MG tablet, Take 400 mg by mouth Daily., Disp: , Rfl:   •  omeprazole (priLOSEC) 20 MG capsule, Take 1 capsule by mouth Daily., Disp: , Rfl:   •  PARoxetine CR (PAXIL-CR) 25 MG 24 hr tablet, Take 1 tablet by mouth Every Morning., Disp: , Rfl:   •  ramipril (ALTACE) 10 MG capsule, Take 1 capsule by mouth 2 (Two) Times a Day., Disp: , Rfl:   •  rosuvastatin (CRESTOR) 20 MG tablet, Take 1 tablet by mouth Daily., Disp: , Rfl:   •  Triamcinolone Acetonide (KENALOG-40 IJ), Inject  as directed. Every 4 months, Disp: , Rfl:   •  VITAMIN D PO, Take 1,200 Units by mouth., Disp: , Rfl:   •  Zinc 50 MG tablet, Take 1 tablet by mouth Daily., Disp: , Rfl:         History of Present Illness    Patient is a 72-year-old  male who presents today for annual follow-up of PFO.  Patient notes that since last visit he fell out of bed while having of  "bad dream.  He subsequently suffered a fracture and had to have eventual surgical intervention.  No cardiac issues during surgery.  Denies any chest pain, pressure.  No reported increase shortness of breath.  No reported syncope, near-syncope, or edema.  However, over the last few months has been noticing some occasional positional dizziness.  Notes that he has checked his blood pressure at home when he has been dizzy and he has been hypotensive with systolics as low as the 70s.  Patient also reports unintentional weight loss of roughly 23 pounds.    The following portions of the patient's history were reviewed and updated as appropriate: allergies, current medications, past family history, past medical history, past social history, past surgical history and problem list.      Social History     Tobacco Use   • Smoking status: Former   • Smokeless tobacco: Never   Vaping Use   • Vaping Use: Never used   Substance Use Topics   • Alcohol use: No   • Drug use: No         ROS       Objective:   /80 (BP Location: Left arm, Patient Position: Sitting)   Pulse 64   Ht 180.3 cm (71\")   Wt 85.7 kg (189 lb)   SpO2 100%   BMI 26.36 kg/m²         Vitals reviewed.   Constitutional:       Appearance: Well-developed and not in distress.   Neck:      Vascular: No JVD.      Trachea: No tracheal deviation.   Pulmonary:      Effort: Pulmonary effort is normal.      Breath sounds: Normal breath sounds.   Cardiovascular:      Normal rate. Regular rhythm.   Edema:     Peripheral edema absent.   Musculoskeletal:         General: No deformity. Skin:     General: Skin is warm and dry.   Neurological:      Mental Status: Alert and oriented to person, place, and time.         Procedures          Assessment:   Assessment & Plan      Diagnoses and all orders for this visit:    1. Orthostatic hypotension (Primary), suspect this is related to intentional weight loss and hypertensive therapy.    2. PFO (patent foramen ovale), stable.  On " aspirin and Plavix.    3. Essential hypertension, some intermittent hypotension.  Currently on amlodipine, ramipril, Coreg.    4. Dyslipidemia, stable.  On statin.  Labs with primary care.      Plan:  1. At this time given patient's noted intermittent hypotension and weight loss we will discontinue amlodipine.  2. Instructed patient to continue to monitor blood pressure.  If systolic blood pressures become consistently 150 or greater he is to contact us or primary care for possible reinstitution of amlodipine at lower dose.  3. Continue other current cardiac medications.  4. Overall, stable from cardiac standpoint.  5. Follow-up in 1 years time or sooner if needed.       Betsy EBGUM     Advance Care Planning   ACP discussion was held with the patient during this visit. Patient has an advance directive (not in EMR), copy requested.        Dictated utilizing Dragon dictation

## 2023-09-19 ENCOUNTER — TELEPHONE (OUTPATIENT)
Dept: CARDIOLOGY | Facility: CLINIC | Age: 73
End: 2023-09-19
Payer: MEDICARE

## 2023-09-19 NOTE — TELEPHONE ENCOUNTER
Requesting pre op risk assessment for knee surgery with Dr. Newman.    Last office visit 05/24/2023    Next 05/29/2024    On plavix and aspirin 81

## 2023-09-19 NOTE — TELEPHONE ENCOUNTER
Left VM advising will need pre op EKG prior to risk assessment, advised can have done in [place] office, dr Newman's office or PCP   Universal Safety Interventions

## 2023-09-22 DIAGNOSIS — R93.1 ABNORMAL ECHOCARDIOGRAM: Primary | ICD-10-CM

## 2023-09-22 DIAGNOSIS — G45.9 TIA (TRANSIENT ISCHEMIC ATTACK): ICD-10-CM

## 2023-09-25 ENCOUNTER — CLINICAL SUPPORT (OUTPATIENT)
Dept: CARDIOLOGY | Facility: CLINIC | Age: 73
End: 2023-09-25
Payer: MEDICARE

## 2023-09-25 DIAGNOSIS — G45.9 TIA (TRANSIENT ISCHEMIC ATTACK): Primary | ICD-10-CM

## 2023-09-25 PROCEDURE — 93000 ELECTROCARDIOGRAM COMPLETE: CPT | Performed by: INTERNAL MEDICINE

## 2023-10-17 ENCOUNTER — TELEPHONE (OUTPATIENT)
Dept: CARDIOLOGY | Facility: CLINIC | Age: 73
End: 2023-10-17

## 2023-10-17 NOTE — PROGRESS NOTES
Subjective:     Encounter Date:10/18/2023    Primary Care Physician: Tae Bansal DO      Patient ID: Nikolas Le is a 73 y.o. male.    Chief Complaint:Follow-up, Hypertension, and Headache      PROBLEM LIST:  PFO:  Echo, 09/14/2019, Dr. Pennington: EF 55-60%. Mild concentric LVH. Mild AI.   Bubble study, 09/24/2019: POSITIVE for PFO with interatrial shunt from right-to-left.   Doppler transcranial microbubble injection, 11/05/2019: small amount HITS are seen in the left MCA.  6/2022 echo normal EF.  No significant valve disease.  TIA:   ED presentation with double vision, dysphasia, slurred speech.[facility] 09/13/201.Medical therapy.  No acute findings by imaging. Echo positive for PFO.   Carotid duplex, 09/13/2019: 0-49% ICA stenosis bilaterally.  MCOT, 10/29/2019: No AF. Rare PACs/PVCs.   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  6/30/2022 normal bilateral carotids by duplex  Palpitations:  24h Holter, 04/25/2012: SR with HR  bpm. No significant arrhythmias. Rare PACs/PVCs.  Stress echocardiogram, 05/09/2012: EF 62%. No evidence of ischemia. Dobutamine induced LITZY with moderate gradient across the LVOT with peak gradient 43.8, mean gradient 23.5.  24h Holter, 02/19/2016: SR with HR 50-82 bpm. No significant arrhythmias. Rare PACs/PVCs.  Hypertension.  Dyslipidemia.  Thyroid disease.  OA.  Surgical history:  Inguinal hernia repair.  Left trisha put in the great to her trochanter right side.      Allergies   Allergen Reactions    Tetracyclines & Related Other (See Comments)     Mouth Blisters    Erythromycin Rash and Unknown - Low Severity    Keflex [Cephalexin] Other (See Comments)     Chills. Body aches    Penicillins Unknown (See Comments)     As a child    Sulfa Antibiotics Rash     Swelling and sores in mouth  Mouth sores           Current Outpatient Medications:     aspirin 81  MG EC tablet, Take 1 tablet by mouth Daily., Disp: , Rfl:     carvedilol (COREG) 25 MG tablet, Take 1 tablet by mouth 2 (Two) Times a Day With Meals., Disp: , Rfl:     clopidogrel (PLAVIX) 75 MG tablet, Take 1 tablet by mouth Daily., Disp: , Rfl:     coenzyme Q10 100 MG capsule, Take 1 capsule by mouth Daily., Disp: , Rfl:     cyanocobalamin 1000 MCG/ML injection, Inject 1 mL into the appropriate muscle as directed by prescriber Every 14 (Fourteen) Days., Disp: , Rfl:     ferrous sulfate 325 (65 FE) MG tablet, Take 1 tablet by mouth Daily With Breakfast., Disp: , Rfl:     HYDROcodone-acetaminophen (NORCO) 5-325 MG per tablet, Take 1 tablet by mouth Every 6 (Six) Hours As Needed., Disp: , Rfl:     hydroxychloroquine (PLAQUENIL) 200 MG tablet, Take 1 tablet by mouth 2 (Two) Times a Day., Disp: , Rfl:     levothyroxine (SYNTHROID, LEVOTHROID) 137 MCG tablet, Take 1 tablet by mouth Daily., Disp: , Rfl:     Magnesium 400 MG tablet, Take 400 mg by mouth Daily., Disp: , Rfl:     omeprazole (priLOSEC) 20 MG capsule, Take 1 capsule by mouth Daily., Disp: , Rfl:     PARoxetine CR (PAXIL-CR) 25 MG 24 hr tablet, Take 1 tablet by mouth Every Morning., Disp: , Rfl:     ramipril (ALTACE) 10 MG capsule, Take 1 capsule by mouth 2 (Two) Times a Day., Disp: , Rfl:     rosuvastatin (CRESTOR) 20 MG tablet, Take 1 tablet by mouth Daily., Disp: , Rfl:     Triamcinolone Acetonide (KENALOG-40 IJ), Inject  as directed. Every 4 months, Disp: , Rfl:     VITAMIN D PO, Take 1,200 Units by mouth., Disp: , Rfl:     Zinc 50 MG tablet, Take 1 tablet by mouth Daily., Disp: , Rfl:         History of Present Illness    Patient returns today for further evaluation of hypertension and intermittent orthostatic hypotension.  Patient denies any chest pain orthopnea and is his usual state of health until approximately 1 week ago when he noted progressive early morning headaches that are awakening him.  He started checking his blood pressure, and noticed that  "his blood pressure was running 170/190s.  He would take his medicines it would come down to normal.  He also notes that during the days and evenings, it sometimes it rises again, but he is always having some orthostatic symptoms.  His blood pressure will fall up to 50-60 points per him and his blood pressure cuff when rising.  He has not had any falls.  Denies chest pain or exertional dyspnea.  He is upcoming scheduled for knee replacement surgery.  No other neurologic symptoms.    The following portions of the patient's history were reviewed and updated as appropriate: allergies, current medications, past family history, past medical history, past social history, past surgical history and problem list.      Social History     Tobacco Use    Smoking status: Former    Smokeless tobacco: Never   Vaping Use    Vaping Use: Never used   Substance Use Topics    Alcohol use: No    Drug use: No         ROS       Objective:   /73 (Patient Position: Sitting)   Pulse 58   Ht 180.3 cm (71\")   Wt 83.3 kg (183 lb 9.6 oz)   SpO2 96%   BMI 25.61 kg/m²         Vitals reviewed.   Constitutional:       Appearance: Well-developed and not in distress.   Neck:      Vascular: No JVD.      Trachea: No tracheal deviation.   Pulmonary:      Effort: Pulmonary effort is normal.      Breath sounds: Normal breath sounds.   Cardiovascular:      Normal rate. Regular rhythm.   Edema:     Peripheral edema absent.   Musculoskeletal:         General: No deformity. Skin:     General: Skin is warm and dry.   Neurological:      Mental Status: Alert and oriented to person, place, and time.         Procedures          Assessment:   Assessment & Plan      Diagnoses and all orders for this visit:    1. Essential hypertension (Primary)      1.  PFO with TIA.  Asymptomatic on current antiplatelet therapy.  Continue to watch  2.  Hypertension, blood pressure is elevated per his blood pressure log which was reviewed by myself.  It is normal however " "today.  3.  Orthostatic hypotension, with 23 mm drop today.  More by his home blood pressure cuffs.  Minimally symptomatic.  4.  Preoperative knee replacement new    Recommendations:  1.  Somewhat puzzling and difficult situation given his hypertension but orthostasis.  His blood pressure is normal here today.  2.  Have him check his blood pressure cuff with primary care physician's office in the next 1 to 2 weeks.  3.  It is likely his blood pressure is elevated in the morning due to his \"medicines wearing off\".  We recommend that he discontinue his ramipril, and restart his amlodipine 5 mg daily.  He will try different times of the day to see which gives his best 24-hour coverage for this but will start in the afternoon.  4.  If the early morning headaches persist after blood pressure controlled may need CT/neurologic evaluation.  5.  Still low cardiovascular risk for upcoming knee replacement surgery and this should not alter care for this  6.  We will check echocardiogram.  Will rule out right to left shunting.  (May have nocturnal sleep apnea leading to nocturnal desaturation and his headache/hypertension.  7.  Outpatient home sleep evaluation.         Advance Care Planning   ACP discussion was held with the patient during this visit. Patient does not have an advance directive, declines further assistance.      Shaheed Morrow MD    Dictated utilizing Dragon dictation  "

## 2023-10-17 NOTE — TELEPHONE ENCOUNTER
Caller: Nikolas Le     Relationship: SELF    Best call back number: [phone]    What is your medical concern? THIS PAST WEEKEND THE PT HAD A HEADACHE. HE STARTED WATCHING HIS BLOOD PRESSURE. IT HAS BEEN HIGH WITH READINGS /92  /84. WHEN HE STANDS UP TO TAKE IT IT DROPS REALLY LOW. THE LOWEST WAS 91/55. PT IS SCHEDULED FOR KNEE SURGERY ON 11.27.23 AND HAS ALREADY BEEN CLEARED FOR THAT. HE IS CONCERNED THAT IT WILL AFFECT HIS ABILITY TO GET THE SURGERY. HE SAID HE FEELS OK AND HAS NO OTHER ISSUES EXCEPT A HEADACHE OCCASIONALLY. PLEASE REACH OUT TO PT TO ADVISE.       How long has this issue been going on? SINCE 10.14.23    Is your provider already aware of this issue? NO    Have you been treated for this issue? YES

## 2023-10-18 ENCOUNTER — OFFICE VISIT (OUTPATIENT)
Dept: CARDIOLOGY | Facility: CLINIC | Age: 73
End: 2023-10-18
Payer: MEDICARE

## 2023-10-18 VITALS
OXYGEN SATURATION: 96 % | BODY MASS INDEX: 25.7 KG/M2 | HEART RATE: 58 BPM | DIASTOLIC BLOOD PRESSURE: 73 MMHG | HEIGHT: 71 IN | SYSTOLIC BLOOD PRESSURE: 134 MMHG | WEIGHT: 183.6 LBS

## 2023-10-18 DIAGNOSIS — R93.1 ABNORMAL ECHOCARDIOGRAM: ICD-10-CM

## 2023-10-18 DIAGNOSIS — I95.1 ORTHOSTATIC HYPOTENSION: ICD-10-CM

## 2023-10-18 DIAGNOSIS — I10 ESSENTIAL HYPERTENSION: Primary | ICD-10-CM

## 2023-10-18 DIAGNOSIS — G47.19 OTHER HYPERSOMNIA: ICD-10-CM

## 2023-10-18 DIAGNOSIS — G45.9 TIA (TRANSIENT ISCHEMIC ATTACK): ICD-10-CM

## 2023-10-18 DIAGNOSIS — I65.23 BILATERAL CAROTID ARTERY STENOSIS: ICD-10-CM

## 2023-10-18 DIAGNOSIS — Q21.12 PFO (PATENT FORAMEN OVALE): ICD-10-CM

## 2023-10-18 PROCEDURE — 3075F SYST BP GE 130 - 139MM HG: CPT | Performed by: INTERNAL MEDICINE

## 2023-10-18 PROCEDURE — 1160F RVW MEDS BY RX/DR IN RCRD: CPT | Performed by: INTERNAL MEDICINE

## 2023-10-18 PROCEDURE — 1159F MED LIST DOCD IN RCRD: CPT | Performed by: INTERNAL MEDICINE

## 2023-10-18 PROCEDURE — 3078F DIAST BP <80 MM HG: CPT | Performed by: INTERNAL MEDICINE

## 2023-10-18 RX ORDER — AMLODIPINE BESYLATE 5 MG/1
5 TABLET ORAL DAILY
Qty: 30 TABLET | Refills: 11 | Status: SHIPPED | OUTPATIENT
Start: 2023-10-18

## 2023-10-20 ENCOUNTER — HOSPITAL ENCOUNTER (OUTPATIENT)
Dept: CARDIOLOGY | Facility: HOSPITAL | Age: 73
Discharge: HOME | End: 2023-10-20
Payer: MEDICARE

## 2023-10-20 VITALS — WEIGHT: 183 LBS | BODY MASS INDEX: 25.62 KG/M2 | HEIGHT: 71 IN

## 2023-10-20 DIAGNOSIS — R93.1 ABNORMAL ECHOCARDIOGRAM: ICD-10-CM

## 2023-10-20 DIAGNOSIS — G45.9 TIA (TRANSIENT ISCHEMIC ATTACK): ICD-10-CM

## 2023-10-20 DIAGNOSIS — I10 ESSENTIAL HYPERTENSION: ICD-10-CM

## 2023-10-20 DIAGNOSIS — I65.23 BILATERAL CAROTID ARTERY STENOSIS: ICD-10-CM

## 2023-10-20 DIAGNOSIS — I95.1 ORTHOSTATIC HYPOTENSION: ICD-10-CM

## 2023-10-20 DIAGNOSIS — Q21.12 PFO (PATENT FORAMEN OVALE): ICD-10-CM

## 2023-10-20 LAB
BH CV ECHO MEAS - AI P1/2T: 507 MSEC
BH CV ECHO MEAS - AO MAX PG: 7.3 MMHG
BH CV ECHO MEAS - AO MEAN PG: 4 MMHG
BH CV ECHO MEAS - AO ROOT DIAM: 3.5 CM
BH CV ECHO MEAS - AO V2 MAX: 134.9 CM/SEC
BH CV ECHO MEAS - AO V2 VTI: 37.2 CM
BH CV ECHO MEAS - AVA(I,D): 2.45 CM2
BH CV ECHO MEAS - EDV(CUBED): 89.5 ML
BH CV ECHO MEAS - EDV(MOD-SP2): 145.7 ML
BH CV ECHO MEAS - EDV(MOD-SP4): 182.5 ML
BH CV ECHO MEAS - EF(MOD-BP): 66 %
BH CV ECHO MEAS - EF(MOD-SP2): 60.6 %
BH CV ECHO MEAS - EF(MOD-SP4): 69.6 %
BH CV ECHO MEAS - ESV(CUBED): 30.5 ML
BH CV ECHO MEAS - ESV(MOD-SP2): 57.4 ML
BH CV ECHO MEAS - ESV(MOD-SP4): 55.5 ML
BH CV ECHO MEAS - FS: 30.1 %
BH CV ECHO MEAS - IVS/LVPW: 0.91 CM
BH CV ECHO MEAS - IVSD: 1.18 CM
BH CV ECHO MEAS - LA DIMENSION: 3.7 CM
BH CV ECHO MEAS - LAT PEAK E' VEL: 10 CM/SEC
BH CV ECHO MEAS - LV DIASTOLIC VOL/BSA (35-75): 89.9 CM2
BH CV ECHO MEAS - LV MASS(C)D: 205.1 GRAMS
BH CV ECHO MEAS - LV MAX PG: 3.6 MMHG
BH CV ECHO MEAS - LV MEAN PG: 1.97 MMHG
BH CV ECHO MEAS - LV SYSTOLIC VOL/BSA (12-30): 27.3 CM2
BH CV ECHO MEAS - LV V1 MAX: 94.5 CM/SEC
BH CV ECHO MEAS - LV V1 VTI: 25.5 CM
BH CV ECHO MEAS - LVIDD: 4.5 CM
BH CV ECHO MEAS - LVIDS: 3.1 CM
BH CV ECHO MEAS - LVOT AREA: 3.6 CM2
BH CV ECHO MEAS - LVOT DIAM: 2.13 CM
BH CV ECHO MEAS - LVPWD: 1.3 CM
BH CV ECHO MEAS - MED PEAK E' VEL: 7 CM/SEC
BH CV ECHO MEAS - MV A MAX VEL: 86 CM/SEC
BH CV ECHO MEAS - MV DEC SLOPE: 231.8 CM/SEC2
BH CV ECHO MEAS - MV DEC TIME: 0.33 SEC
BH CV ECHO MEAS - MV E MAX VEL: 75.4 CM/SEC
BH CV ECHO MEAS - MV E/A: 0.88
BH CV ECHO MEAS - MV MAX PG: 4.1 MMHG
BH CV ECHO MEAS - MV MEAN PG: 1.81 MMHG
BH CV ECHO MEAS - MV P1/2T: 145 MSEC
BH CV ECHO MEAS - MV V2 VTI: 38.3 CM
BH CV ECHO MEAS - MVA(P1/2T): 1.5 CM2
BH CV ECHO MEAS - MVA(VTI): 2.38 CM2
BH CV ECHO MEAS - PA ACC TIME: 0.19 SEC
BH CV ECHO MEAS - PA V2 MAX: 157.7 CM/SEC
BH CV ECHO MEAS - RAP SYSTOLE: 3 MMHG
BH CV ECHO MEAS - RVSP: 26.2 MMHG
BH CV ECHO MEAS - SI(MOD-SP2): 43.5 ML/M2
BH CV ECHO MEAS - SI(MOD-SP4): 62.5 ML/M2
BH CV ECHO MEAS - SV(LVOT): 91 ML
BH CV ECHO MEAS - SV(MOD-SP2): 88.3 ML
BH CV ECHO MEAS - SV(MOD-SP4): 127 ML
BH CV ECHO MEAS - TAPSE (>1.6): 2.2 CM
BH CV ECHO MEAS - TR MAX PG: 23.2 MMHG
BH CV ECHO MEAS - TR MAX VEL: 240.6 CM/SEC
BH CV ECHO MEASUREMENTS AVERAGE E/E' RATIO: 8.87
BH CV VAS BP LEFT ARM: NORMAL MMHG
BH CV XLRA - RV BASE: 4.2 CM
BH CV XLRA - RV LENGTH: 7.3 CM
BH CV XLRA - RV MID: 3.9 CM
BH CV XLRA - TDI S': 9 CM/SEC
IVRT: 103 MS
LEFT ATRIUM VOLUME INDEX: 36.5 ML/M2
LV EF 2D ECHO EST: 55 %

## 2023-10-20 PROCEDURE — 93306 TTE W/DOPPLER COMPLETE: CPT

## 2023-10-23 DIAGNOSIS — I95.1 ORTHOSTATIC HYPOTENSION: ICD-10-CM

## 2023-10-23 DIAGNOSIS — G47.19 OTHER HYPERSOMNIA: ICD-10-CM

## 2023-10-23 DIAGNOSIS — G45.9 TIA (TRANSIENT ISCHEMIC ATTACK): ICD-10-CM

## 2023-10-23 DIAGNOSIS — Q21.12 PFO (PATENT FORAMEN OVALE): ICD-10-CM

## 2023-10-23 DIAGNOSIS — I10 ESSENTIAL HYPERTENSION: ICD-10-CM

## 2023-10-23 DIAGNOSIS — I65.23 BILATERAL CAROTID ARTERY STENOSIS: ICD-10-CM

## 2023-10-23 DIAGNOSIS — R93.1 ABNORMAL ECHOCARDIOGRAM: Primary | ICD-10-CM

## 2023-10-24 ENCOUNTER — TELEPHONE (OUTPATIENT)
Dept: CARDIOLOGY | Facility: CLINIC | Age: 73
End: 2023-10-24
Payer: MEDICARE

## 2023-10-24 NOTE — TELEPHONE ENCOUNTER
Left VM advising of below  ----- Message from Shaheed Morrow MD sent at 10/23/2023  4:44 PM EDT -----  Normal echo.  Shunt study was not done

## 2024-04-02 ENCOUNTER — TELEPHONE (OUTPATIENT)
Dept: CARDIOLOGY | Facility: CLINIC | Age: 74
End: 2024-04-02
Payer: MEDICARE

## 2024-04-02 NOTE — TELEPHONE ENCOUNTER
"Requesting pre EGD, colonoscopy, EUS, ERCP.   Last office visit 10/18/23, ** sleep study not completed as ordered in Oct  Assessment & Plan  Diagnoses and all orders for this visit:     1. Essential hypertension (Primary)        1.  PFO with TIA.  Asymptomatic on current antiplatelet therapy.  Continue to watch  2.  Hypertension, blood pressure is elevated per his blood pressure log which was reviewed by myself.  It is normal however today.  3.  Orthostatic hypotension, with 23 mm drop today.  More by his home blood pressure cuffs.  Minimally symptomatic.  4.  Preoperative knee replacement new     Recommendations:  1.  Somewhat puzzling and difficult situation given his hypertension but orthostasis.  His blood pressure is normal here today.  2.  Have him check his blood pressure cuff with primary care physician's office in the next 1 to 2 weeks.  3.  It is likely his blood pressure is elevated in the morning due to his \"medicines wearing off\".  We recommend that he discontinue his ramipril, and restart his amlodipine 5 mg daily.  He will try different times of the day to see which gives his best 24-hour coverage for this but will start in the afternoon.  4.  If the early morning headaches persist after blood pressure controlled may need CT/neurologic evaluation.  5.  Still low cardiovascular risk for upcoming knee replacement surgery and this should not alter care for this  6.  We will check echocardiogram.  Will rule out right to left shunting.  (May have nocturnal sleep apnea leading to nocturnal desaturation and his headache/hypertension.  7.  Outpatient home sleep evaluation.  "

## 2024-04-23 NOTE — PROGRESS NOTES
Subjective:     Encounter Date:04/24/2024    Primary Care Physician: Tae Bansal DO      Patient ID: Nikolas Le is a 73 y.o. male.    Chief Complaint:Hypertension    PROBLEM LIST:  PFO:  Echo, 09/14/2019, Dr. Pennington: EF 55-60%. Mild concentric LVH. Mild AI.   Bubble study, 09/24/2019: POSITIVE for PFO with interatrial shunt from right-to-left.   Doppler transcranial microbubble injection, 11/05/2019: small amount HITS are seen in the left MCA.  6/2022 echo normal EF.  No significant valve disease.  TIA:   ED presentation with double vision, dysphasia, slurred speech.[facility] 09/13/201.Medical therapy.  No acute findings by imaging. Echo positive for PFO.   Carotid duplex, 09/13/2019: 0-49% ICA stenosis bilaterally.  MCOT, 10/29/2019: No AF. Rare PACs/PVCs.   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  6/30/2022 normal bilateral carotids by duplex  Palpitations:  24h Holter, 04/25/2012: SR with HR  bpm. No significant arrhythmias. Rare PACs/PVCs.  Stress echocardiogram, 05/09/2012: EF 62%. No evidence of ischemia. Dobutamine induced LITZY with moderate gradient across the LVOT with peak gradient 43.8, mean gradient 23.5.  24h Holter, 02/19/2016: SR with HR 50-82 bpm. No significant arrhythmias. Rare PACs/PVCs.  Hypertension.  Dyslipidemia.  Orthostatic hypotension  Thyroid disease.  OA.  Iron deficiency anemia.  Negative colonoscopy/EGD 2024  Surgical history:  Inguinal hernia repair.  Left trisha put in the great to her trochanter right side.  Left total knee replacement 2023      Allergies   Allergen Reactions    Tetracyclines & Related Other (See Comments)     Mouth Blisters    Erythromycin Rash and Unknown - Low Severity    Keflex [Cephalexin] Other (See Comments)     Chills. Body aches    Penicillins Unknown (See Comments)     As a child    Sulfa Antibiotics Rash     Swelling  and sores in mouth  Mouth sores           Current Outpatient Medications:     amLODIPine (NORVASC) 5 MG tablet, Take 1 tablet by mouth Daily., Disp: 30 tablet, Rfl: 11    aspirin 81 MG EC tablet, Take 1 tablet by mouth Daily., Disp: , Rfl:     carvedilol (COREG) 25 MG tablet, Take 1 tablet by mouth 2 (Two) Times a Day With Meals., Disp: , Rfl:     clopidogrel (PLAVIX) 75 MG tablet, Take 1 tablet by mouth Daily., Disp: , Rfl:     coenzyme Q10 100 MG capsule, Take 1 capsule by mouth Daily., Disp: , Rfl:     cyanocobalamin 1000 MCG/ML injection, Inject 1 mL into the appropriate muscle as directed by prescriber Every 14 (Fourteen) Days., Disp: , Rfl:     ferrous sulfate 325 (65 FE) MG tablet, Take 1 tablet by mouth Daily With Breakfast., Disp: , Rfl:     HYDROcodone-acetaminophen (NORCO) 5-325 MG per tablet, Take 1 tablet by mouth Every 6 (Six) Hours As Needed., Disp: , Rfl:     hydroxychloroquine (PLAQUENIL) 200 MG tablet, Take 1 tablet by mouth 2 (Two) Times a Day., Disp: , Rfl:     levothyroxine (SYNTHROID, LEVOTHROID) 137 MCG tablet, Take 1 tablet by mouth Daily., Disp: , Rfl:     Magnesium 400 MG tablet, Take 400 mg by mouth Daily., Disp: , Rfl:     omeprazole (priLOSEC) 20 MG capsule, Take 1 capsule by mouth Daily., Disp: , Rfl:     PARoxetine CR (PAXIL-CR) 25 MG 24 hr tablet, Take 1 tablet by mouth Every Morning., Disp: , Rfl:     rosuvastatin (CRESTOR) 20 MG tablet, Take 1 tablet by mouth Daily., Disp: , Rfl:     Triamcinolone Acetonide (KENALOG-40 IJ), Inject  as directed. Every 4 months, Disp: , Rfl:     VITAMIN D PO, Take 1,200 Units by mouth., Disp: , Rfl:     Zinc 50 MG tablet, Take 1 tablet by mouth Daily., Disp: , Rfl:         History of Present Illness    Patient returns today for follow-up of TIA, palpitations and orthostatic symptoms.  Last visit he had a successful orthopedic surgery.  He was recently diagnosed with low iron.  He notes continued orthostasis with significant dizziness throughout the  "day.  He notes labile blood pressures with some elevated and some low readings but his symptoms of dizziness consistently correspond to blood systolic blood pressures less than 100.  Notes his heart rate at home is typically in the 50s as well.  No exertional chest pain or dyspnea.    The following portions of the patient's history were reviewed and updated as appropriate: allergies, current medications, past family history, past medical history, past social history, past surgical history and problem list.      Social History     Tobacco Use    Smoking status: Former    Smokeless tobacco: Never   Vaping Use    Vaping status: Never Used   Substance Use Topics    Alcohol use: No    Drug use: No         ROS       Objective:   BP 90/64   Pulse 50   Ht 180.3 cm (71\")   Wt 84.3 kg (185 lb 12.8 oz)   SpO2 95%   BMI 25.91 kg/m²         Vitals reviewed.   Constitutional:       Appearance: Well-developed and not in distress.   Neck:      Thyroid: No thyromegaly.      Vascular: No carotid bruit or JVD.   Pulmonary:      Breath sounds: Normal breath sounds.   Cardiovascular:      Regular rhythm.      No gallop. No S3 and S4 gallop.   Pulses:     Intact distal pulses.      Carotid: 2+ bilaterally.     Radial: 2+ bilaterally.  Edema:     Peripheral edema absent.   Abdominal:      General: Bowel sounds are normal.      Palpations: Abdomen is soft. There is no abdominal mass.      Tenderness: There is no abdominal tenderness.   Musculoskeletal:         General: No deformity.      Extremities: No clubbing present.Skin:     General: Skin is warm and dry.      Findings: No rash.   Neurological:      Mental Status: Alert and oriented to person, place, and time.         Procedures          Assessment:   Assessment & Plan      Diagnoses and all orders for this visit:    1. Orthostatic hypotension (Primary)      1.  Orthostatic hypotension/autonomic neuropathy  2.  Sinus bradycardia secondary to beta-blocker  3.  History of TIA " presumably through PFO.  4.  Dyslipidemia on statin    Recommendations:  1.  Discussed options regarding his orthostasis with the patient.  At this time, he really should be more tachycardic given his orthostatic hypotension and his low iron levels.  Discussed this with the patient and family and as such we would slowly wean his beta-blocker dose.  2.  Decrease carvedilol to 12.5 mg twice daily.  3.  If still relatively bradycardic during low blood pressure episodes, we will further decrease his beta-blocker dose in 1 month's time.  (Patient will call if this is the case).  4.  If his hypertension increases, we may need using nonrate slowing agents for this.  5.  Revisit in 3 months time or as needed symptom change.         Advance Care Planning   ACP discussion was held with the patient during this visit. Patient does not have an advance directive, information provided.      Shaheed Morrow MD    Dictated utilizing Dragon dictation

## 2024-04-24 ENCOUNTER — OFFICE VISIT (OUTPATIENT)
Dept: CARDIOLOGY | Facility: CLINIC | Age: 74
End: 2024-04-24
Payer: MEDICARE

## 2024-04-24 VITALS
HEIGHT: 71 IN | SYSTOLIC BLOOD PRESSURE: 90 MMHG | WEIGHT: 185.8 LBS | HEART RATE: 50 BPM | OXYGEN SATURATION: 95 % | BODY MASS INDEX: 26.01 KG/M2 | DIASTOLIC BLOOD PRESSURE: 64 MMHG

## 2024-04-24 DIAGNOSIS — I95.1 ORTHOSTATIC HYPOTENSION: Primary | ICD-10-CM

## 2024-04-24 PROCEDURE — 3078F DIAST BP <80 MM HG: CPT | Performed by: INTERNAL MEDICINE

## 2024-04-24 PROCEDURE — 3074F SYST BP LT 130 MM HG: CPT | Performed by: INTERNAL MEDICINE

## 2024-04-24 PROCEDURE — 1160F RVW MEDS BY RX/DR IN RCRD: CPT | Performed by: INTERNAL MEDICINE

## 2024-04-24 PROCEDURE — 1159F MED LIST DOCD IN RCRD: CPT | Performed by: INTERNAL MEDICINE

## 2024-04-24 PROCEDURE — 99214 OFFICE O/P EST MOD 30 MIN: CPT | Performed by: INTERNAL MEDICINE

## 2024-04-24 RX ORDER — CARVEDILOL 12.5 MG/1
12.5 TABLET ORAL 2 TIMES DAILY
Qty: 60 TABLET | Refills: 5 | Status: SHIPPED | OUTPATIENT
Start: 2024-04-24

## 2024-08-07 ENCOUNTER — OFFICE VISIT (OUTPATIENT)
Dept: CARDIOLOGY | Facility: CLINIC | Age: 74
End: 2024-08-07
Payer: MEDICARE

## 2024-08-07 VITALS
OXYGEN SATURATION: 95 % | BODY MASS INDEX: 25.9 KG/M2 | WEIGHT: 185 LBS | HEIGHT: 71 IN | SYSTOLIC BLOOD PRESSURE: 157 MMHG | HEART RATE: 58 BPM | DIASTOLIC BLOOD PRESSURE: 76 MMHG

## 2024-08-07 DIAGNOSIS — I95.1 ORTHOSTATIC HYPOTENSION: Primary | ICD-10-CM

## 2024-08-07 DIAGNOSIS — I10 ESSENTIAL HYPERTENSION: ICD-10-CM

## 2024-08-07 DIAGNOSIS — Q21.12 PFO (PATENT FORAMEN OVALE): ICD-10-CM

## 2024-08-07 PROCEDURE — 3078F DIAST BP <80 MM HG: CPT | Performed by: NURSE PRACTITIONER

## 2024-08-07 PROCEDURE — 3077F SYST BP >= 140 MM HG: CPT | Performed by: NURSE PRACTITIONER

## 2024-08-07 PROCEDURE — 1160F RVW MEDS BY RX/DR IN RCRD: CPT | Performed by: NURSE PRACTITIONER

## 2024-08-07 PROCEDURE — 99214 OFFICE O/P EST MOD 30 MIN: CPT | Performed by: NURSE PRACTITIONER

## 2024-08-07 PROCEDURE — 1159F MED LIST DOCD IN RCRD: CPT | Performed by: NURSE PRACTITIONER

## 2024-08-07 NOTE — PROGRESS NOTES
Subjective:     Encounter Date:08/07/2024    Primary Care Physician: Tae Bansal DO      Patient ID: Nikolas Le is a 74 y.o. male.    Chief Complaint:Hypotension      PROBLEM LIST:  PFO:  Echo, 09/14/2019, Dr. Pennington: EF 55-60%. Mild concentric LVH. Mild AI.   Bubble study, 09/24/2019: POSITIVE for PFO with interatrial shunt from right-to-left.   Doppler transcranial microbubble injection, 11/05/2019: small amount HITS are seen in the left MCA.  6/2022 echo normal EF.  No significant valve disease.  10/2023 echo EF  55%.  Mild AR.  TIA:   ED presentation with double vision, dysphasia, slurred speech.[facility] 09/13/201.Medical therapy.  No acute findings by imaging. Echo positive for PFO.   Carotid duplex, 09/13/2019: 0-49% ICA stenosis bilaterally.  MCOT, 10/29/2019: No AF. Rare PACs/PVCs.   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  6/30/2022 normal bilateral carotids by duplex  Palpitations:  24h Holter, 04/25/2012: SR with HR  bpm. No significant arrhythmias. Rare PACs/PVCs.  Stress echocardiogram, 05/09/2012: EF 62%. No evidence of ischemia. Dobutamine induced LITZY with moderate gradient across the LVOT with peak gradient 43.8, mean gradient 23.5.  24h Holter, 02/19/2016: SR with HR 50-82 bpm. No significant arrhythmias. Rare PACs/PVCs.  Hypertension.  Dyslipidemia.  Orthostatic hypotension  Thyroid disease.  OA.  Iron deficiency anemia.  Negative colonoscopy/EGD 2024  Surgical history:  Inguinal hernia repair.  Left trisha put in the great to her trochanter right side.  Left total knee replacement 2023        Allergies   Allergen Reactions    Tetracyclines & Related Other (See Comments)     Mouth Blisters    Erythromycin Rash and Unknown - Low Severity    Keflex [Cephalexin] Other (See Comments)     Chills. Body aches    Penicillins Unknown (See Comments)     As a child     Sulfa Antibiotics Rash     Swelling and sores in mouth  Mouth sores           Current Outpatient Medications:     amLODIPine (NORVASC) 5 MG tablet, Take 1 tablet by mouth Daily., Disp: 30 tablet, Rfl: 11    aspirin 81 MG EC tablet, Take 1 tablet by mouth Daily., Disp: , Rfl:     carvedilol (COREG) 12.5 MG tablet, Take 1 tablet by mouth 2 (Two) Times a Day., Disp: 60 tablet, Rfl: 5    clopidogrel (PLAVIX) 75 MG tablet, Take 1 tablet by mouth Daily., Disp: , Rfl:     coenzyme Q10 100 MG capsule, Take 1 capsule by mouth Daily., Disp: , Rfl:     cyanocobalamin 1000 MCG/ML injection, Inject 1 mL into the appropriate muscle as directed by prescriber Every 14 (Fourteen) Days., Disp: , Rfl:     ferrous sulfate 325 (65 FE) MG tablet, Take 1 tablet by mouth Daily With Breakfast., Disp: , Rfl:     HYDROcodone-acetaminophen (NORCO) 5-325 MG per tablet, Take 1 tablet by mouth Every 6 (Six) Hours As Needed., Disp: , Rfl:     hydroxychloroquine (PLAQUENIL) 200 MG tablet, Take 1 tablet by mouth 2 (Two) Times a Day., Disp: , Rfl:     levothyroxine (SYNTHROID, LEVOTHROID) 137 MCG tablet, Take 1 tablet by mouth Daily., Disp: , Rfl:     Magnesium 400 MG tablet, Take 400 mg by mouth Daily., Disp: , Rfl:     omeprazole (priLOSEC) 20 MG capsule, Take 1 capsule by mouth Daily., Disp: , Rfl:     PARoxetine CR (PAXIL-CR) 25 MG 24 hr tablet, Take 1 tablet by mouth Every Morning., Disp: , Rfl:     rosuvastatin (CRESTOR) 20 MG tablet, Take 1 tablet by mouth Daily., Disp: , Rfl:     Triamcinolone Acetonide (KENALOG-40 IJ), Inject  as directed. Every 4 months, Disp: , Rfl:     VITAMIN D PO, Take 1,200 Units by mouth., Disp: , Rfl:     Zinc 50 MG tablet, Take 1 tablet by mouth Daily., Disp: , Rfl:         History of Present Illness    Patient is a 74-year-old  male who presents today for 3-month follow-up of orthostasis.  Since last being seen patient notes that he still has persistent orthostatic dizziness.  Notes that his blood pressure  "continues to drop.  Has also been having continued bradycardia with heart rates in the 50s.  Typically resting blood pressure at home is in the 150s after having his beta-blocker decreased.  He denies any loss of consciousness.  Denies any chest pain or increasing shortness of breath.    The following portions of the patient's history were reviewed and updated as appropriate: allergies, current medications, past family history, past medical history, past social history, past surgical history and problem list.      Social History     Tobacco Use    Smoking status: Former    Smokeless tobacco: Never   Vaping Use    Vaping status: Never Used   Substance Use Topics    Alcohol use: No    Drug use: No         ROS       Objective:   /76   Pulse 58   Ht 180.3 cm (71\")   Wt 83.9 kg (185 lb)   SpO2 95%   BMI 25.80 kg/m²         Vitals reviewed.   Constitutional:       Appearance: Well-developed and not in distress.   Neck:      Vascular: No JVD.      Trachea: No tracheal deviation.   Pulmonary:      Effort: Pulmonary effort is normal.      Breath sounds: Normal breath sounds.   Cardiovascular:      Normal rate. Regular rhythm.      Murmurs: There is no murmur.   Edema:     Peripheral edema absent.   Musculoskeletal:         General: No deformity. Skin:     General: Skin is warm and dry.   Neurological:      Mental Status: Alert and oriented to person, place, and time.         Procedures          Assessment:   Assessment & Plan      Diagnoses and all orders for this visit:    1. Orthostatic hypotension (Primary), still with intermittent orthostatic symptoms.  As well as some bradycardia.    2. Essential hypertension, will allow some permissive hypertension secondary to symptomatic orthostasis.  Currently on amlodipine.    3. PFO (patent foramen ovale), stable.  On aspirin and Plavix.      Plan:  Decrease carvedilol to 6.25 mg p.o. twice daily.  I have asked the patient to monitor his blood pressure for the next " couple of weeks.  If resting blood pressure remains greater than 150 consistently or higher he is to contact our office and at that time we will increase his amlodipine to 10 mg daily.  Continue other current cardiac medications.  Follow-up in the office in 3 months time or sooner if needed.       Betsy BEGUM     Advance Care Planning   ACP discussion was held with the patient during this visit. Patient has an advance directive (not in EMR), copy requested.        Dictated utilizing Dragon dictation

## 2024-08-07 NOTE — LETTER
August 7, 2024       No Recipients    Patient: Nikolas Le   YOB: 1950   Date of Visit: 8/7/2024     Dear Tae Bansal DO:       Thank you for referring Nikolas Le to me for evaluation. Below are the relevant portions of my assessment and plan of care.    If you have questions, please do not hesitate to call me. I look forward to following Nikolas along with you.         Sincerely,        SHY Rushing        CC:   No Recipients    Betsy Sadler APRN  08/07/24 1518  Sign when Signing Visit  Subjective:     Encounter Date:08/07/2024    Primary Care Physician: Tae Bansal DO      Patient ID: Nikolas Le is a 74 y.o. male.    Chief Complaint:Hypotension      PROBLEM LIST:  PFO:  Echo, 09/14/2019, Dr. Pennington: EF 55-60%. Mild concentric LVH. Mild AI.   Bubble study, 09/24/2019: POSITIVE for PFO with interatrial shunt from right-to-left.   Doppler transcranial microbubble injection, 11/05/2019: small amount HITS are seen in the left MCA.  6/2022 echo normal EF.  No significant valve disease.  10/2023 echo EF  55%.  Mild AR.  TIA:   ED presentation with double vision, dysphasia, slurred speech.[facility] 09/13/201.Medical therapy.  No acute findings by imaging. Echo positive for PFO.   Carotid duplex, 09/13/2019: 0-49% ICA stenosis bilaterally.  MCOT, 10/29/2019: No AF. Rare PACs/PVCs.   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  6/30/2022 normal bilateral carotids by duplex  Palpitations:  24h Holter, 04/25/2012: SR with HR  bpm. No significant arrhythmias. Rare PACs/PVCs.  Stress echocardiogram, 05/09/2012: EF 62%. No evidence of ischemia. Dobutamine induced LITZY with moderate gradient across the LVOT with peak gradient 43.8, mean gradient 23.5.  24h Holter, 02/19/2016: SR with HR 50-82 bpm. No significant arrhythmias. Rare  PACs/PVCs.  Hypertension.  Dyslipidemia.  Orthostatic hypotension  Thyroid disease.  OA.  Iron deficiency anemia.  Negative colonoscopy/EGD 2024  Surgical history:  Inguinal hernia repair.  Left trisha put in the great to her trochanter right side.  Left total knee replacement 2023        Allergies   Allergen Reactions   • Tetracyclines & Related Other (See Comments)     Mouth Blisters   • Erythromycin Rash and Unknown - Low Severity   • Keflex [Cephalexin] Other (See Comments)     Chills. Body aches   • Penicillins Unknown (See Comments)     As a child   • Sulfa Antibiotics Rash     Swelling and sores in mouth  Mouth sores           Current Outpatient Medications:   •  amLODIPine (NORVASC) 5 MG tablet, Take 1 tablet by mouth Daily., Disp: 30 tablet, Rfl: 11  •  aspirin 81 MG EC tablet, Take 1 tablet by mouth Daily., Disp: , Rfl:   •  carvedilol (COREG) 12.5 MG tablet, Take 1 tablet by mouth 2 (Two) Times a Day., Disp: 60 tablet, Rfl: 5  •  clopidogrel (PLAVIX) 75 MG tablet, Take 1 tablet by mouth Daily., Disp: , Rfl:   •  coenzyme Q10 100 MG capsule, Take 1 capsule by mouth Daily., Disp: , Rfl:   •  cyanocobalamin 1000 MCG/ML injection, Inject 1 mL into the appropriate muscle as directed by prescriber Every 14 (Fourteen) Days., Disp: , Rfl:   •  ferrous sulfate 325 (65 FE) MG tablet, Take 1 tablet by mouth Daily With Breakfast., Disp: , Rfl:   •  HYDROcodone-acetaminophen (NORCO) 5-325 MG per tablet, Take 1 tablet by mouth Every 6 (Six) Hours As Needed., Disp: , Rfl:   •  hydroxychloroquine (PLAQUENIL) 200 MG tablet, Take 1 tablet by mouth 2 (Two) Times a Day., Disp: , Rfl:   •  levothyroxine (SYNTHROID, LEVOTHROID) 137 MCG tablet, Take 1 tablet by mouth Daily., Disp: , Rfl:   •  Magnesium 400 MG tablet, Take 400 mg by mouth Daily., Disp: , Rfl:   •  omeprazole (priLOSEC) 20 MG capsule, Take 1 capsule by mouth Daily., Disp: , Rfl:   •  PARoxetine CR (PAXIL-CR) 25 MG 24 hr tablet, Take 1 tablet by mouth Every Morning.,  "Disp: , Rfl:   •  rosuvastatin (CRESTOR) 20 MG tablet, Take 1 tablet by mouth Daily., Disp: , Rfl:   •  Triamcinolone Acetonide (KENALOG-40 IJ), Inject  as directed. Every 4 months, Disp: , Rfl:   •  VITAMIN D PO, Take 1,200 Units by mouth., Disp: , Rfl:   •  Zinc 50 MG tablet, Take 1 tablet by mouth Daily., Disp: , Rfl:         History of Present Illness    Patient is a 74-year-old  male who presents today for 3-month follow-up of orthostasis.  Since last being seen patient notes that he still has persistent orthostatic dizziness.  Notes that his blood pressure continues to drop.  Has also been having continued bradycardia with heart rates in the 50s.  Typically resting blood pressure at home is in the 150s after having his beta-blocker decreased.  He denies any loss of consciousness.  Denies any chest pain or increasing shortness of breath.    The following portions of the patient's history were reviewed and updated as appropriate: allergies, current medications, past family history, past medical history, past social history, past surgical history and problem list.      Social History     Tobacco Use   • Smoking status: Former   • Smokeless tobacco: Never   Vaping Use   • Vaping status: Never Used   Substance Use Topics   • Alcohol use: No   • Drug use: No         ROS       Objective:   /74   Pulse 58   Ht 180.3 cm (71\")   Wt 83.9 kg (185 lb)   SpO2 95%   BMI 25.80 kg/m²         Vitals reviewed.   Constitutional:       Appearance: Well-developed and not in distress.   Neck:      Vascular: No JVD.      Trachea: No tracheal deviation.   Pulmonary:      Effort: Pulmonary effort is normal.      Breath sounds: Normal breath sounds.   Cardiovascular:      Normal rate. Regular rhythm.      Murmurs: There is no murmur.   Edema:     Peripheral edema absent.   Musculoskeletal:         General: No deformity. Skin:     General: Skin is warm and dry.   Neurological:      Mental Status: Alert and oriented to " person, place, and time.         Procedures          Assessment:   Assessment & Plan     Diagnoses and all orders for this visit:    1. Orthostatic hypotension (Primary), still with intermittent orthostatic symptoms.  As well as some bradycardia.    2. Essential hypertension, will allow some permissive hypertension secondary to symptomatic orthostasis.  Currently on amlodipine.    3. PFO (patent foramen ovale), stable.  On aspirin and Plavix.      Plan:  Decrease carvedilol to 6.25 mg p.o. twice daily.  I have asked the patient to monitor his blood pressure for the next couple of weeks.  If resting blood pressure remains greater than 150 consistently or higher he is to contact our office and at that time we will increase his amlodipine to 10 mg daily.  Continue other current cardiac medications.  Follow-up in the office in 3 months time or sooner if needed.       Betsy BEGUM     Advance Care Planning  ACP discussion was held with the patient during this visit. Patient has an advance directive (not in EMR), copy requested.        Dictated utilizing Dragon dictation

## 2024-10-27 RX ORDER — CARVEDILOL 12.5 MG/1
12.5 TABLET ORAL 2 TIMES DAILY
Qty: 180 TABLET | Refills: 3 | Status: SHIPPED | OUTPATIENT
Start: 2024-10-27

## 2024-11-11 RX ORDER — AMLODIPINE BESYLATE 5 MG/1
5 TABLET ORAL DAILY
Qty: 90 TABLET | Refills: 3 | Status: SHIPPED | OUTPATIENT
Start: 2024-11-11

## 2024-11-19 NOTE — PROGRESS NOTES
Subjective:     Encounter Date:11/20/2024    Primary Care Physician: Tae Bansal DO      Patient ID: Nikolas Le is a 74 y.o. male.    Chief Complaint:Orthostatic hypotension      PROBLEM LIST:  PFO:  Echo, 09/14/2019, Dr. Pennington: EF 55-60%. Mild concentric LVH. Mild AI.   Bubble study, 09/24/2019: POSITIVE for PFO with interatrial shunt from right-to-left.   Doppler transcranial microbubble injection, 11/05/2019: small amount HITS are seen in the left MCA.  6/2022 echo normal EF.  No significant valve disease.  10/2023 echo EF  55%.  Mild AR.  TIA:   ED presentation with double vision, dysphasia, slurred speech.[facility] 09/13/201.Medical therapy.  No acute findings by imaging. Echo positive for PFO.   Carotid duplex, 09/13/2019: 0-49% ICA stenosis bilaterally.  MCOT, 10/29/2019: No AF. Rare PACs/PVCs.   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  6/30/2022 normal bilateral carotids by duplex  Palpitations:  24h Holter, 04/25/2012: SR with HR  bpm. No significant arrhythmias. Rare PACs/PVCs.  Stress echocardiogram, 05/09/2012: EF 62%. No evidence of ischemia. Dobutamine induced LITZY with moderate gradient across the LVOT with peak gradient 43.8, mean gradient 23.5.  24h Holter, 02/19/2016: SR with HR 50-82 bpm. No significant arrhythmias. Rare PACs/PVCs.  Hypertension.  Dyslipidemia.  Orthostatic hypotension  Thyroid disease.  OA.  Iron deficiency anemia.  Negative colonoscopy/EGD 2024  Surgical history:  Inguinal hernia repair.  Left trisha put in the great to her trochanter right side.  Left total knee replacement 2023        Allergies   Allergen Reactions    Tetracyclines & Related Other (See Comments)     Mouth Blisters    Erythromycin Rash and Unknown - Low Severity    Keflex [Cephalexin] Other (See Comments)     Chills. Body aches    Penicillins Unknown (See Comments)     As  a child    Sulfa Antibiotics Rash     Swelling and sores in mouth  Mouth sores           Current Outpatient Medications:     alendronate (FOSAMAX) 70 MG tablet, Take 1 tablet by mouth 1 (One) Time Per Week., Disp: , Rfl:     amLODIPine (NORVASC) 5 MG tablet, TAKE 1 TABLET BY MOUTH EVERY DAY, Disp: 90 tablet, Rfl: 3    aspirin 81 MG EC tablet, Take 1 tablet by mouth Daily., Disp: , Rfl:     carvedilol (COREG) 12.5 MG tablet, TAKE 1 TABLET BY MOUTH TWICE A DAY (Patient taking differently: Take 0.5 tablets by mouth 2 (Two) Times a Day.), Disp: 180 tablet, Rfl: 3    clopidogrel (PLAVIX) 75 MG tablet, Take 1 tablet by mouth Daily., Disp: , Rfl:     coenzyme Q10 100 MG capsule, Take 1 capsule by mouth Daily., Disp: , Rfl:     cyanocobalamin 1000 MCG/ML injection, Inject 1 mL into the appropriate muscle as directed by prescriber Every 14 (Fourteen) Days., Disp: , Rfl:     ferrous sulfate 325 (65 FE) MG tablet, Take 1 tablet by mouth Daily With Breakfast., Disp: , Rfl:     HYDROcodone-acetaminophen (NORCO) 5-325 MG per tablet, Take 1 tablet by mouth Every 6 (Six) Hours As Needed., Disp: , Rfl:     hydroxychloroquine (PLAQUENIL) 200 MG tablet, Take 1 tablet by mouth 2 (Two) Times a Day., Disp: , Rfl:     levothyroxine (SYNTHROID, LEVOTHROID) 137 MCG tablet, Take 1 tablet by mouth Daily., Disp: , Rfl:     Magnesium 400 MG tablet, Take 400 mg by mouth Daily., Disp: , Rfl:     omeprazole (priLOSEC) 20 MG capsule, Take 1 capsule by mouth Daily., Disp: , Rfl:     PARoxetine CR (PAXIL-CR) 25 MG 24 hr tablet, Take 1 tablet by mouth Every Morning., Disp: , Rfl:     rosuvastatin (CRESTOR) 20 MG tablet, Take 1 tablet by mouth Daily., Disp: , Rfl:     Triamcinolone Acetonide (KENALOG-40 IJ), Inject  as directed. Every 4 months, Disp: , Rfl:     VITAMIN D PO, Take 1,200 Units by mouth., Disp: , Rfl:     Zinc 50 MG tablet, Take 1 tablet by mouth Daily., Disp: , Rfl:         History of Present Illness    Patient turns today for annual  "follow-up of hypertension, orthostasis and PFO.  Since her last visit he is doing very well is active without any chest pain shortness of breath.  Denies any cardiovascular complaints.  Notes since decreasing his carvedilol dose, his orthostatic symptoms are dramatically better.  Essentially only rarely occurring in the last year.  Blood pressure log from home reviewed, systolic blood pressures predominately running 120s to 140s.  Have been elevated the last 2 days only.    The following portions of the patient's history were reviewed and updated as appropriate: allergies, current medications, past family history, past medical history, past social history, past surgical history and problem list.      Social History     Tobacco Use    Smoking status: Former    Smokeless tobacco: Never   Vaping Use    Vaping status: Never Used   Substance Use Topics    Alcohol use: No    Drug use: No         ROS       Objective:   /77 (BP Location: Left arm, Patient Position: Sitting, Cuff Size: Adult)   Pulse 55   Ht 180.3 cm (71\")   Wt 85.7 kg (189 lb)   SpO2 98%   BMI 26.36 kg/m²         Vitals reviewed.   Constitutional:       Appearance: Well-developed and not in distress.   Neck:      Thyroid: No thyromegaly.      Vascular: No carotid bruit or JVD.   Pulmonary:      Breath sounds: Normal breath sounds.   Cardiovascular:      Regular rhythm.      No gallop. No S3 and S4 gallop.   Pulses:     Intact distal pulses.      Carotid: 2+ bilaterally.     Radial: 2+ bilaterally.  Edema:     Peripheral edema absent.   Abdominal:      General: Bowel sounds are normal.      Palpations: Abdomen is soft. There is no abdominal mass.      Tenderness: There is no abdominal tenderness.   Musculoskeletal:         General: No deformity.      Extremities: No clubbing present.Skin:     General: Skin is warm and dry.      Findings: No rash.   Neurological:      Mental Status: Alert and oriented to person, place, and time. "         Procedures          Assessment:   Assessment & Plan      Diagnoses and all orders for this visit:    1. Essential hypertension (Primary)      1.  Hypertension, well-controlled by home blood pressure log but elevated today.  2.  Chronic orthostasis.  Improved with less carvedilol  3.  PFO with history of TIA.  No recurrence of symptoms on current antiplatelets.  4.  Bradycardia due to carvedilol.  Asymptomatic currently no indication for pacemaker    Recommendations:  1.  Discussed patient's blood pressure, he will simply keep a log at home, it has been normal per Jaime's over the last 2 days.  If it remains elevated for several weeks he will contact our office consider adding another agent.  2.  Continue other current medical therapy  3.  Revisit annually apparent symptom change           Advance Care Planning   ACP discussion was held with the patient during this visit. Patient has an advance directive (not in EMR), copy requested.      Shaheed Morrow MD    Dictated utilizing Dragon dictation

## 2024-11-20 ENCOUNTER — OFFICE VISIT (OUTPATIENT)
Dept: CARDIOLOGY | Facility: CLINIC | Age: 74
End: 2024-11-20
Payer: MEDICARE

## 2024-11-20 VITALS
BODY MASS INDEX: 26.46 KG/M2 | HEIGHT: 71 IN | SYSTOLIC BLOOD PRESSURE: 167 MMHG | DIASTOLIC BLOOD PRESSURE: 77 MMHG | OXYGEN SATURATION: 98 % | HEART RATE: 55 BPM | WEIGHT: 189 LBS

## 2024-11-20 DIAGNOSIS — I10 ESSENTIAL HYPERTENSION: Primary | ICD-10-CM

## 2024-11-20 PROCEDURE — 1160F RVW MEDS BY RX/DR IN RCRD: CPT | Performed by: INTERNAL MEDICINE

## 2024-11-20 PROCEDURE — 3077F SYST BP >= 140 MM HG: CPT | Performed by: INTERNAL MEDICINE

## 2024-11-20 PROCEDURE — 99213 OFFICE O/P EST LOW 20 MIN: CPT | Performed by: INTERNAL MEDICINE

## 2024-11-20 PROCEDURE — 3078F DIAST BP <80 MM HG: CPT | Performed by: INTERNAL MEDICINE

## 2024-11-20 PROCEDURE — 1159F MED LIST DOCD IN RCRD: CPT | Performed by: INTERNAL MEDICINE

## 2024-11-20 RX ORDER — CARVEDILOL 6.25 MG/1
6.25 TABLET ORAL 2 TIMES DAILY
Qty: 180 TABLET | Refills: 3 | Status: SHIPPED | OUTPATIENT
Start: 2024-11-20

## 2024-11-20 RX ORDER — ALENDRONATE SODIUM 70 MG/1
1 TABLET ORAL WEEKLY
COMMUNITY
Start: 2024-09-05